# Patient Record
Sex: FEMALE | Race: WHITE | NOT HISPANIC OR LATINO | Employment: OTHER | URBAN - NONMETROPOLITAN AREA
[De-identification: names, ages, dates, MRNs, and addresses within clinical notes are randomized per-mention and may not be internally consistent; named-entity substitution may affect disease eponyms.]

---

## 2023-12-29 ENCOUNTER — CLINICAL SUPPORT (OUTPATIENT)
Dept: FAMILY MEDICINE CLINIC | Facility: CLINIC | Age: 48
End: 2023-12-29
Payer: MEDICARE

## 2023-12-29 DIAGNOSIS — N91.2 AMENORRHEA: ICD-10-CM

## 2023-12-29 DIAGNOSIS — E78.2 MIXED HYPERLIPIDEMIA: ICD-10-CM

## 2023-12-29 DIAGNOSIS — Z13.228 SCREENING FOR ENDOCRINE, METABOLIC AND IMMUNITY DISORDER: ICD-10-CM

## 2023-12-29 DIAGNOSIS — Z13.29 SCREENING FOR THYROID DISORDER: ICD-10-CM

## 2023-12-29 DIAGNOSIS — E11.65 CONTROLLED TYPE 2 DIABETES MELLITUS WITH HYPERGLYCEMIA, WITHOUT LONG-TERM CURRENT USE OF INSULIN: ICD-10-CM

## 2023-12-29 DIAGNOSIS — Z13.29 SCREENING FOR ENDOCRINE, METABOLIC AND IMMUNITY DISORDER: ICD-10-CM

## 2023-12-29 DIAGNOSIS — Z13.0 SCREENING FOR ENDOCRINE, METABOLIC AND IMMUNITY DISORDER: ICD-10-CM

## 2023-12-29 PROCEDURE — 83001 ASSAY OF GONADOTROPIN (FSH): CPT | Performed by: REGISTERED NURSE

## 2023-12-29 PROCEDURE — 83002 ASSAY OF GONADOTROPIN (LH): CPT | Performed by: REGISTERED NURSE

## 2023-12-29 PROCEDURE — 84443 ASSAY THYROID STIM HORMONE: CPT | Performed by: REGISTERED NURSE

## 2023-12-29 PROCEDURE — 85025 COMPLETE CBC W/AUTO DIFF WBC: CPT | Performed by: REGISTERED NURSE

## 2023-12-29 PROCEDURE — 80053 COMPREHEN METABOLIC PANEL: CPT | Performed by: REGISTERED NURSE

## 2023-12-29 PROCEDURE — 36415 COLL VENOUS BLD VENIPUNCTURE: CPT | Performed by: REGISTERED NURSE

## 2023-12-29 PROCEDURE — 80061 LIPID PANEL: CPT | Performed by: REGISTERED NURSE

## 2023-12-29 PROCEDURE — 83036 HEMOGLOBIN GLYCOSYLATED A1C: CPT | Performed by: REGISTERED NURSE

## 2023-12-29 NOTE — PROGRESS NOTES
The ABCs of the Annual Wellness Visit  Subsequent Medicare Wellness Visit    Chief Complaint   Patient presents with    Medicare Wellness-subsequent       Subjective   History of Present Illness:  Raya Dejesus is a 48 y.o. female who presents for a Subsequent Medicare Wellness Visit. The patient is here: for coordination of medical care to discuss health maintenance and disease prevention to follow up on multiple medical problems. Overall has: minimal activity with work/home activities. Nutrition: balanced diet. Last tetanus shot was unknown.    Patient is a 48 y.o. female  presents to the clinic today for annual medicare wellness exam.  Patient denies any chest pain, shortness of breath, or any fevers.  Patient denies any known exposure to COVID, flu, or any other contagious illnesses.    Patient shares that she has been experiencing amenorrhea for approximately 2 months now. We discussed FSH and LH lab work. Patient appears most likely in the ovulation phase. We discussed menopause and how that menstruation can become irregular as menopause nears.        HEALTH RISK ASSESSMENT    Recent Hospitalizations:  No hospitalization(s) within the last year.    Current Medical Providers:  Patient Care Team:  Mehrdad Rosas APRN as PCP - General (Family Medicine)    Smoking Status:  Social History     Tobacco Use   Smoking Status Former    Packs/day: 2.00    Years: 15.00    Additional pack years: 0.00    Total pack years: 30.00    Types: Cigarettes    Start date: 1991    Quit date: 2010    Years since quittin.5    Passive exposure: Past   Smokeless Tobacco Never       Alcohol Consumption:  Social History     Substance and Sexual Activity   Alcohol Use Not Currently       Depression Screen:       2024     8:56 AM   PHQ-2/PHQ-9 Depression Screening   Little Interest or Pleasure in Doing Things 0-->not at all   Feeling Down, Depressed or Hopeless 0-->not at all   PHQ-9: Brief Depression Severity  Measure Score 0     I spent less than 16 minutes asking patient questions, counseling and documenting in the chart    Fall Risk Screen:  AMADA Fall Risk Assessment was completed, and patient is at HIGH risk for falls. Assessment completed on:2024    Health Habits and Functional and Cognitive Screenin/2/2024     8:00 AM   Functional & Cognitive Status   Do you have difficulty preparing food and eating? No   Do you have difficulty bathing yourself, getting dressed or grooming yourself? No   Do you have difficulty using the toilet? No   Do you have difficulty moving around from place to place? No   Do you have trouble with steps or getting out of a bed or a chair? No   Current Diet Well Balanced Diet   Dental Exam Up to date   Eye Exam Up to date   Exercise (times per week) 0 times per week   Current Exercises Include No Regular Exercise   Do you need help using the phone?  No   Are you deaf or do you have serious difficulty hearing?  No   Do you need help to go to places out of walking distance? No   Do you need help shopping? No   Do you need help preparing meals?  No   Do you need help with housework?  No   Do you need help with laundry? No   Do you need help taking your medications? No   Do you need help managing money? No   Do you ever drive or ride in a car without wearing a seat belt? No   Have you felt unusual stress, anger or loneliness in the last month? No   Who do you live with? Alone   If you need help, do you have trouble finding someone available to you? No   Have you been bothered in the last four weeks by sexual problems? No   Do you have difficulty concentrating, remembering or making decisions? No       Does the patient have evidence of cognitive impairment? No    Asprin use counseling:Taking ASA appropriately as indicated    Recent Lab Results:  Lab Results   Component Value Date    TRIG 146 2023    HDL 45 2023     (H) 2023    VLDL 26 2023    HGBA1C 5.20  12/29/2023        Age-appropriate Screening Schedule:  Refer to the list below for future screening recommendations based on patient's age, sex and/or medical conditions. Orders for these recommended tests are listed in the plan section. The patient has been provided with a written plan.    Health Maintenance   Topic Date Due    ANNUAL WELLNESS VISIT  Never done    DIABETIC FOOT EXAM  Never done    URINE MICROALBUMIN  08/16/2023    COVID-19 Vaccine (4 - 2023-24 season) 01/29/2024 (Originally 9/1/2023)    Hepatitis B (1 of 3 - 3-dose series) 11/09/2024 (Originally 1975)    Pneumococcal Vaccine 0-64 (1 of 2 - PCV) 11/09/2024 (Originally 1/26/1982)    TDAP/TD VACCINES (1 - Tdap) 11/09/2024 (Originally 1/26/1995)    DIABETIC EYE EXAM  02/01/2024    HEMOGLOBIN A1C  06/29/2024    LIPID PANEL  12/29/2024    PAP SMEAR  04/18/2025    COLORECTAL CANCER SCREENING  11/08/2032    HEPATITIS C SCREENING  Completed    INFLUENZA VACCINE  Completed          The following portions of the patient's history were reviewed and updated as appropriate: allergies, current medications, past family history, past medical history, past social history, past surgical history, and problem list.    Outpatient Medications Prior to Visit   Medication Sig Dispense Refill    albuterol sulfate  (90 Base) MCG/ACT inhaler Inhale 2 puffs Every 4 (Four) Hours As Needed for Wheezing or Shortness of Air. 18 g 0    aspirin 81 MG chewable tablet Chew 1 tablet Daily.      carvedilol (COREG) 25 MG tablet Take 1 tablet by mouth 2 (Two) Times a Day With Meals.      Entresto 24-26 MG tablet Take 1 tablet by mouth 2 (Two) Times a Day.      fenofibrate (TRICOR) 145 MG tablet Take 1 tablet by mouth Daily. 90 tablet 1    Fluticasone-Salmeterol (Advair Diskus) 100-50 MCG/ACT DISKUS Inhale 1 puff 2 (Two) Times a Day. 60 each 5    furosemide (LASIX) 20 MG tablet Take 1 tablet by mouth As Needed.      multivitamin with minerals tablet tablet Take 1 tablet by  mouth Daily.      spironolactone (ALDACTONE) 25 MG tablet Take 1 tablet by mouth Daily.      albuterol sulfate  (90 Base) MCG/ACT inhaler Inhale 1 puff Every 4 (Four) Hours As Needed for Wheezing or Shortness of Air. (Patient not taking: Reported on 1/2/2024) 6.7 g 5    cetirizine (zyrTEC) 10 MG tablet Take 1 tablet by mouth Daily. (Patient not taking: Reported on 1/2/2024) 30 tablet 0    famotidine (PEPCID) 40 MG tablet Take 1 tablet by mouth every night at bedtime. (Patient not taking: Reported on 1/2/2024)       No facility-administered medications prior to visit.       Patient Active Problem List   Diagnosis    Asthma    Hyperlipidemia    Mild chronic obstructive pulmonary disease    Biventricular ICD (implantable cardioverter-defibrillator) in place    Chronic systolic CHF (congestive heart failure), NYHA class 3    Diabetes type 2, controlled    Dilated cardiomyopathy    Benign essential HTN    Fatigue    Hx of mitral valve prolapse    LBBB (left bundle branch block)    Migraine without aura    Mitral valve disorder    Neuropathy    Obstructive sleep apnea    Overweight    Restless legs    Shoulder joint pain    Ulcer, stomach peptic    Pulmonary emphysema       Advanced Care Planning:  ACP discussion was held with the patient during this visit. Patient does not have an advance directive, declines further assistance.    A face-to-face visit was completed today with patient.  Counseling explanation, and discussion of advanced directives was performed.   The last advanced care visit was performed in 2023.  In a near life ending situation, from which she is not expected to recover functionally, and she is not able to speak for her, she does not want life sustaining measures. We discussed feeding tubes, ventilators and cardiac support as well as dialysis.    I spent less than 16 minutes discussing Advanced Care Planning information and documenting in the chart.    Review of Systems    I have reviewed and  "confirmed the accuracy of the HPI and ROS as documented by the MA/LPN/RN DELBERT Katz    Compared to one year ago, the patient feels her physical health is the same.  Compared to one year ago, the patient feels her mental health is the same.    Reviewed chart for potential of high risk medication in the elderly: yes  Reviewed chart for potential of harmful drug interactions in the elderly:yes    Objective      Vitals:    01/02/24 0859   BP: 102/60   BP Location: Left arm   Patient Position: Sitting   Cuff Size: Adult   Pulse: 88   Resp: 18   Temp: 98.8 °F (37.1 °C)   TempSrc: Oral   SpO2: 100%   Weight: 61.7 kg (136 lb)   Height: 160 cm (63\")   PainSc:   7       Body mass index is 24.09 kg/m².  Discussed the patient's BMI with her. The BMI is in the acceptable range.    Physical Exam    Assessment    Medicare Risks and Personalized Health Plan  CMS Preventative Services Quick Reference  Immunizations Discussed/Encouraged (specific immunizations; Tdap )    The above risks/problems have been discussed with the patient.  Pertinent information has been shared with the patient in the After Visit Summary.  Follow up plans and orders are seen below in the Assessment/Plan Section.    Problem List Items Addressed This Visit          Cardiac and Vasculature    Hyperlipidemia - Primary    Relevant Orders    Lipid Panel (Completed)       Endocrine and Metabolic    Diabetes type 2, controlled    Relevant Orders    Hemoglobin A1c (Completed)     Other Visit Diagnoses       Screening for thyroid disorder        Relevant Orders    TSH (Completed)    Screening for endocrine, metabolic and immunity disorder        Relevant Orders    CBC & Differential (Completed)    Comprehensive Metabolic Panel (Completed)    Amenorrhea        Relevant Orders    Follicle stimulating hormone (Completed)    Luteinizing hormone (Completed)          Follow Up:  Return in about 3 months (around 4/2/2024) for routine follow up.     An After " Visit Summary was given to the patient.       DELBERT Katz, FNP-BC

## 2023-12-29 NOTE — PROGRESS NOTES
Venipuncture Blood Specimen Collection  Venipuncture performed in rt arm via venipuncture by Alexi Anderson with good hemostasis. Patient tolerated the procedure well without complications.   12/29/23   Alexi Anderson

## 2023-12-30 LAB
ALBUMIN SERPL-MCNC: 4.5 G/DL (ref 3.5–5.2)
ALBUMIN/GLOB SERPL: 2.1 G/DL
ALP SERPL-CCNC: 37 U/L (ref 39–117)
ALT SERPL W P-5'-P-CCNC: 10 U/L (ref 1–33)
ANION GAP SERPL CALCULATED.3IONS-SCNC: 11.5 MMOL/L (ref 5–15)
AST SERPL-CCNC: 21 U/L (ref 1–32)
BASOPHILS # BLD AUTO: 0.03 10*3/MM3 (ref 0–0.2)
BASOPHILS NFR BLD AUTO: 0.4 % (ref 0–1.5)
BILIRUB SERPL-MCNC: 0.8 MG/DL (ref 0–1.2)
BUN SERPL-MCNC: 9 MG/DL (ref 6–20)
BUN/CREAT SERPL: 10.3 (ref 7–25)
CALCIUM SPEC-SCNC: 9.6 MG/DL (ref 8.6–10.5)
CHLORIDE SERPL-SCNC: 101 MMOL/L (ref 98–107)
CHOLEST SERPL-MCNC: 209 MG/DL (ref 0–200)
CO2 SERPL-SCNC: 25.5 MMOL/L (ref 22–29)
CREAT SERPL-MCNC: 0.87 MG/DL (ref 0.57–1)
DEPRECATED RDW RBC AUTO: 41 FL (ref 37–54)
EGFRCR SERPLBLD CKD-EPI 2021: 82.8 ML/MIN/1.73
EOSINOPHIL # BLD AUTO: 0.07 10*3/MM3 (ref 0–0.4)
EOSINOPHIL NFR BLD AUTO: 1 % (ref 0.3–6.2)
ERYTHROCYTE [DISTWIDTH] IN BLOOD BY AUTOMATED COUNT: 12.2 % (ref 12.3–15.4)
FSH SERPL-ACNC: 8.58 MIU/ML
GLOBULIN UR ELPH-MCNC: 2.1 GM/DL
GLUCOSE SERPL-MCNC: 93 MG/DL (ref 65–99)
HBA1C MFR BLD: 5.2 % (ref 4.8–5.6)
HCT VFR BLD AUTO: 38 % (ref 34–46.6)
HDLC SERPL-MCNC: 45 MG/DL (ref 40–60)
HGB BLD-MCNC: 12.9 G/DL (ref 12–15.9)
IMM GRANULOCYTES # BLD AUTO: 0.02 10*3/MM3 (ref 0–0.05)
IMM GRANULOCYTES NFR BLD AUTO: 0.3 % (ref 0–0.5)
LDLC SERPL CALC-MCNC: 138 MG/DL (ref 0–100)
LDLC/HDLC SERPL: 3 {RATIO}
LH SERPL-ACNC: 24.8 MIU/ML
LYMPHOCYTES # BLD AUTO: 1.62 10*3/MM3 (ref 0.7–3.1)
LYMPHOCYTES NFR BLD AUTO: 23 % (ref 19.6–45.3)
MCH RBC QN AUTO: 31.3 PG (ref 26.6–33)
MCHC RBC AUTO-ENTMCNC: 33.9 G/DL (ref 31.5–35.7)
MCV RBC AUTO: 92.2 FL (ref 79–97)
MONOCYTES # BLD AUTO: 0.6 10*3/MM3 (ref 0.1–0.9)
MONOCYTES NFR BLD AUTO: 8.5 % (ref 5–12)
NEUTROPHILS NFR BLD AUTO: 4.71 10*3/MM3 (ref 1.7–7)
NEUTROPHILS NFR BLD AUTO: 66.8 % (ref 42.7–76)
NRBC BLD AUTO-RTO: 0 /100 WBC (ref 0–0.2)
PLATELET # BLD AUTO: 283 10*3/MM3 (ref 140–450)
PMV BLD AUTO: 10.5 FL (ref 6–12)
POTASSIUM SERPL-SCNC: 4 MMOL/L (ref 3.5–5.2)
PROT SERPL-MCNC: 6.6 G/DL (ref 6–8.5)
RBC # BLD AUTO: 4.12 10*6/MM3 (ref 3.77–5.28)
SODIUM SERPL-SCNC: 138 MMOL/L (ref 136–145)
TRIGL SERPL-MCNC: 146 MG/DL (ref 0–150)
TSH SERPL DL<=0.05 MIU/L-ACNC: 1.88 UIU/ML (ref 0.27–4.2)
VLDLC SERPL-MCNC: 26 MG/DL (ref 5–40)
WBC NRBC COR # BLD AUTO: 7.05 10*3/MM3 (ref 3.4–10.8)

## 2024-01-02 ENCOUNTER — OFFICE VISIT (OUTPATIENT)
Dept: FAMILY MEDICINE CLINIC | Facility: CLINIC | Age: 49
End: 2024-01-02
Payer: MEDICARE

## 2024-01-02 VITALS
RESPIRATION RATE: 18 BRPM | HEIGHT: 63 IN | SYSTOLIC BLOOD PRESSURE: 102 MMHG | BODY MASS INDEX: 24.1 KG/M2 | WEIGHT: 136 LBS | TEMPERATURE: 98.8 F | DIASTOLIC BLOOD PRESSURE: 60 MMHG | HEART RATE: 88 BPM | OXYGEN SATURATION: 100 %

## 2024-01-02 DIAGNOSIS — E11.65 CONTROLLED TYPE 2 DIABETES MELLITUS WITH HYPERGLYCEMIA, WITHOUT LONG-TERM CURRENT USE OF INSULIN: ICD-10-CM

## 2024-01-02 DIAGNOSIS — Z00.00 ENCOUNTER FOR SUBSEQUENT ANNUAL WELLNESS VISIT (AWV) IN MEDICARE PATIENT: Primary | ICD-10-CM

## 2024-01-02 DIAGNOSIS — N91.2 AMENORRHEA: ICD-10-CM

## 2024-01-02 DIAGNOSIS — Z13.29 SCREENING FOR THYROID DISORDER: ICD-10-CM

## 2024-01-02 DIAGNOSIS — Z13.228 SCREENING FOR ENDOCRINE, METABOLIC AND IMMUNITY DISORDER: ICD-10-CM

## 2024-01-02 DIAGNOSIS — Z13.0 SCREENING FOR ENDOCRINE, METABOLIC AND IMMUNITY DISORDER: ICD-10-CM

## 2024-01-02 DIAGNOSIS — E78.2 MIXED HYPERLIPIDEMIA: ICD-10-CM

## 2024-01-02 DIAGNOSIS — Z13.29 SCREENING FOR ENDOCRINE, METABOLIC AND IMMUNITY DISORDER: ICD-10-CM

## 2024-01-02 PROCEDURE — 1160F RVW MEDS BY RX/DR IN RCRD: CPT | Performed by: REGISTERED NURSE

## 2024-01-02 PROCEDURE — 1170F FXNL STATUS ASSESSED: CPT | Performed by: REGISTERED NURSE

## 2024-01-02 PROCEDURE — 1159F MED LIST DOCD IN RCRD: CPT | Performed by: REGISTERED NURSE

## 2024-01-02 PROCEDURE — 3078F DIAST BP <80 MM HG: CPT | Performed by: REGISTERED NURSE

## 2024-01-02 PROCEDURE — G0439 PPPS, SUBSEQ VISIT: HCPCS | Performed by: REGISTERED NURSE

## 2024-01-02 PROCEDURE — 3074F SYST BP LT 130 MM HG: CPT | Performed by: REGISTERED NURSE

## 2024-04-02 ENCOUNTER — OFFICE VISIT (OUTPATIENT)
Dept: FAMILY MEDICINE CLINIC | Facility: CLINIC | Age: 49
End: 2024-04-02
Payer: MEDICARE

## 2024-04-02 VITALS
DIASTOLIC BLOOD PRESSURE: 80 MMHG | WEIGHT: 135 LBS | TEMPERATURE: 98.1 F | BODY MASS INDEX: 23.92 KG/M2 | HEART RATE: 67 BPM | RESPIRATION RATE: 18 BRPM | OXYGEN SATURATION: 99 % | HEIGHT: 63 IN | SYSTOLIC BLOOD PRESSURE: 110 MMHG

## 2024-04-02 DIAGNOSIS — R05.1 ACUTE COUGH: ICD-10-CM

## 2024-04-02 DIAGNOSIS — E78.2 MIXED HYPERLIPIDEMIA: ICD-10-CM

## 2024-04-02 DIAGNOSIS — S09.90XA INJURY OF HEAD, INITIAL ENCOUNTER: ICD-10-CM

## 2024-04-02 DIAGNOSIS — R11.2 NAUSEA AND VOMITING, UNSPECIFIED VOMITING TYPE: ICD-10-CM

## 2024-04-02 DIAGNOSIS — R42 DIZZINESS: ICD-10-CM

## 2024-04-02 DIAGNOSIS — J45.909 MODERATE ASTHMA, UNSPECIFIED WHETHER COMPLICATED, UNSPECIFIED WHETHER PERSISTENT: Primary | ICD-10-CM

## 2024-04-02 PROCEDURE — 1160F RVW MEDS BY RX/DR IN RCRD: CPT | Performed by: REGISTERED NURSE

## 2024-04-02 PROCEDURE — 3074F SYST BP LT 130 MM HG: CPT | Performed by: REGISTERED NURSE

## 2024-04-02 PROCEDURE — 99215 OFFICE O/P EST HI 40 MIN: CPT | Performed by: REGISTERED NURSE

## 2024-04-02 PROCEDURE — 3079F DIAST BP 80-89 MM HG: CPT | Performed by: REGISTERED NURSE

## 2024-04-02 PROCEDURE — 1159F MED LIST DOCD IN RCRD: CPT | Performed by: REGISTERED NURSE

## 2024-04-02 RX ORDER — MECLIZINE HYDROCHLORIDE 25 MG/1
25 TABLET ORAL 3 TIMES DAILY PRN
Qty: 30 TABLET | Refills: 1 | Status: SHIPPED | OUTPATIENT
Start: 2024-04-02

## 2024-04-02 RX ORDER — ONDANSETRON 4 MG/1
4 TABLET, ORALLY DISINTEGRATING ORAL EVERY 8 HOURS PRN
Qty: 30 TABLET | Refills: 1 | Status: SHIPPED | OUTPATIENT
Start: 2024-04-02

## 2024-04-02 RX ORDER — ALBUTEROL SULFATE 90 UG/1
2 AEROSOL, METERED RESPIRATORY (INHALATION) EVERY 4 HOURS PRN
Qty: 18 G | Refills: 1 | Status: SHIPPED | OUTPATIENT
Start: 2024-04-02

## 2024-04-02 RX ORDER — FENOFIBRATE 145 MG/1
145 TABLET, COATED ORAL DAILY
Qty: 90 TABLET | Refills: 2 | Status: SHIPPED | OUTPATIENT
Start: 2024-04-02

## 2024-04-02 RX ORDER — FLUTICASONE PROPIONATE AND SALMETEROL 100; 50 UG/1; UG/1
1 POWDER RESPIRATORY (INHALATION)
Qty: 60 EACH | Refills: 5 | Status: SHIPPED | OUTPATIENT
Start: 2024-04-02

## 2024-04-02 NOTE — PROGRESS NOTES
Chief Complaint  Follow-up (Patient is here for a 3 month follow up), Diabetes, and Headache (Patient has been having dizziness and headache past two days, she stated she hit her head on a cabinet pretty hard and it is . )    Subjective    History of Present Illness {CC  Problem List  Visit  Diagnosis   Encounters  Notes  Medications  Labs  Result Review Imaging  Media :23}     Raya Dejesus presents to [facility] for Follow-up (Patient is here for a 3 month follow up), Diabetes, and Headache (Patient has been having dizziness and headache past two days, she stated she hit her head on a cabinet pretty hard and it is . ).      History of Present Illness  Patient is a 49 y.o. female  presents to the clinic today for 3-month follow-up for asthma and hyperlipidemia with concerns of recent head injury x 1 week.  Patient denies any chest pain, shortness of breath, or any fevers.  Patient denies any known exposure to COVID, flu, or any other contagious illnesses.    In regards to asthma, patient is currently taking Advair for daily management of her asthma.  She does have albuterol board if she has moments of extreme shortness of breath and needs rescue inhaler.  Patient denies any significant changes in her breathing and shares that she rarely needs her rescue inhaler.  Patient denies any concerns with her asthma or asthma medications at this time.    In regard to her hyperlipidemia, patient is currently taking fenofibrate.  Reviewed her labs from December at today's visit.  Patient is currently not fasting. She is agreeable to fasting labs at the next 3 month visit.    In regards to head injury, patient reports that she hit the top of her head on her countertop at home while bending over and trying to stand back up last week.  She shares that she did not lose consciousness, but reports some blurry vision in both eyes, nausea.  An episode of vomitting,  "Dizzy, and intermittent headaches.  She shares that tylenol is not helping with the headaches.  Report symptoms are not worsening,but  they are staying the same.  She reports that her head is sore to the touch.  When asking what she did for medical attention she shares that she did not go to ER.  I do advise her to go in for thorough evaluation but she would rather avoid the ER at this time.  She is requesting a head CT to see if she has a concussion.       Patient continues to follow with [facility]/heart failure/pacemaker clinic.  She denies any new concerns regarding her heart failure.    Patient requested refills on her albuterol prn and advair daily inhaler at today's visit. She denies worsening shortness of breath and wheezing.     Concussion  This is a new problem. The current episode started in the past 7 days. The problem occurs constantly. The problem has been unchanged. Associated symptoms include headaches, nausea and a visual change. Pertinent negatives include no abdominal pain, arthralgias, chest pain, chills, congestion, coughing, fatigue, fever, myalgias, numbness, rash, sore throat, vomiting or weakness. Nothing aggravates the symptoms. She has tried acetaminophen, relaxation and rest for the symptoms. The treatment provided no relief.        Review of Systems   Constitutional: Negative.  Negative for activity change, chills, fatigue and fever.   HENT:  Negative for congestion, dental problem, ear pain, hearing loss, rhinorrhea, sinus pain, sore throat, tinnitus and trouble swallowing.    Eyes:  Positive for visual disturbance. Negative for pain.        Blurry vision \"Like a film over my eyes\"   Respiratory: Negative.  Negative for cough, chest tightness, shortness of breath and wheezing.    Cardiovascular: Negative.  Negative for chest pain, palpitations and leg swelling.   Gastrointestinal:  Positive for nausea. Negative for abdominal pain, diarrhea and vomiting.   Endocrine: Negative.  " "Negative for polydipsia, polyphagia and polyuria.   Genitourinary: Negative.  Negative for difficulty urinating, dysuria, frequency and urgency.   Musculoskeletal: Negative.  Negative for arthralgias, back pain and myalgias.   Skin: Negative.  Negative for color change, pallor, rash and wound.   Allergic/Immunologic: Negative.  Negative for environmental allergies.   Neurological:  Positive for dizziness, light-headedness and headaches. Negative for syncope, speech difficulty, weakness and numbness.   Hematological: Negative.    Psychiatric/Behavioral: Negative.  Negative for confusion, decreased concentration, self-injury and suicidal ideas. The patient is not nervous/anxious.    All other systems reviewed and are negative.       Objective     Vital Signs:   /80 (BP Location: Left arm, Patient Position: Sitting, Cuff Size: Adult)   Pulse 67   Temp 98.1 °F (36.7 °C) (Oral)   Resp 18   Ht 160 cm (63\")   Wt 61.2 kg (135 lb)   SpO2 99%   BMI 23.91 kg/m²   Current Outpatient Medications on File Prior to Visit   Medication Sig Dispense Refill    aspirin 81 MG chewable tablet Chew 1 tablet Daily.      carvedilol (COREG) 25 MG tablet Take 1 tablet by mouth 2 (Two) Times a Day With Meals.      Entresto 24-26 MG tablet Take 1 tablet by mouth 2 (Two) Times a Day.      furosemide (LASIX) 20 MG tablet Take 1 tablet by mouth As Needed.      multivitamin with minerals tablet tablet Take 1 tablet by mouth Daily.      spironolactone (ALDACTONE) 25 MG tablet Take 1 tablet by mouth Daily.      albuterol sulfate  (90 Base) MCG/ACT inhaler Inhale 1 puff Every 4 (Four) Hours As Needed for Wheezing or Shortness of Air. (Patient not taking: Reported on 1/2/2024) 6.7 g 5    cetirizine (zyrTEC) 10 MG tablet Take 1 tablet by mouth Daily. (Patient not taking: Reported on 1/2/2024) 30 tablet 0    famotidine (PEPCID) 40 MG tablet Take 1 tablet by mouth every night at bedtime. (Patient not taking: Reported on 1/2/2024)   "     No current facility-administered medications on file prior to visit.        Past Medical History:   Diagnosis Date    Arthritis     Cardiomyopathy     CHF (congestive heart failure)     COPD (chronic obstructive pulmonary disease)     Diabetes mellitus     GERD (gastroesophageal reflux disease)     Heart disease     Hyperlipidemia     Hypertension     ICD (implantable cardioverter-defibrillator) battery depletion     LBBB (left bundle branch block)     Low back pain     MVP (mitral valve prolapse)     KONG (nonalcoholic steatohepatitis)     Neuropathy     UTI (urinary tract infection) 2022      Past Surgical History:   Procedure Laterality Date    CARDIAC CATHETERIZATION      COLONOSCOPY  2022    Negative    ENDOMETRIAL ABLATION      ENDOSCOPY  2022    Negative    TUBAL ABDOMINAL LIGATION        Family History   Problem Relation Age of Onset    Ovarian cancer Mother     Breast cancer Mother     Cancer Mother     Heart disease Father     Ovarian cancer Maternal Grandmother     Heart disease Maternal Grandmother     Diabetes Maternal Grandmother     Ovarian cancer Paternal Grandmother     Heart disease Paternal Grandmother     Breast cancer Maternal Aunt     Breast cancer Paternal Aunt       Social History     Socioeconomic History    Marital status:    Tobacco Use    Smoking status: Former     Current packs/day: 0.00     Average packs/day: 2.0 packs/day for 19.5 years (39.0 ttl pk-yrs)     Types: Cigarettes     Start date: 1991     Quit date: 2010     Years since quittin.7     Passive exposure: Past    Smokeless tobacco: Never   Vaping Use    Vaping status: Never Used   Substance and Sexual Activity    Alcohol use: Not Currently    Drug use: Never    Sexual activity: Not Currently     Partners: Male     Birth control/protection: None         No visits with results within 3 Month(s) from this visit.   Latest known visit with results is:   Clinical Support on 2023   Component  Date Value Ref Range Status    Glucose 12/29/2023 93  65 - 99 mg/dL Final    BUN 12/29/2023 9  6 - 20 mg/dL Final    Creatinine 12/29/2023 0.87  0.57 - 1.00 mg/dL Final    Sodium 12/29/2023 138  136 - 145 mmol/L Final    Potassium 12/29/2023 4.0  3.5 - 5.2 mmol/L Final    Chloride 12/29/2023 101  98 - 107 mmol/L Final    CO2 12/29/2023 25.5  22.0 - 29.0 mmol/L Final    Calcium 12/29/2023 9.6  8.6 - 10.5 mg/dL Final    Total Protein 12/29/2023 6.6  6.0 - 8.5 g/dL Final    Albumin 12/29/2023 4.5  3.5 - 5.2 g/dL Final    ALT (SGPT) 12/29/2023 10  1 - 33 U/L Final    AST (SGOT) 12/29/2023 21  1 - 32 U/L Final    Alkaline Phosphatase 12/29/2023 37 (L)  39 - 117 U/L Final    Total Bilirubin 12/29/2023 0.8  0.0 - 1.2 mg/dL Final    Globulin 12/29/2023 2.1  gm/dL Final    A/G Ratio 12/29/2023 2.1  g/dL Final    BUN/Creatinine Ratio 12/29/2023 10.3  7.0 - 25.0 Final    Anion Gap 12/29/2023 11.5  5.0 - 15.0 mmol/L Final    eGFR 12/29/2023 82.8  >60.0 mL/min/1.73 Final    Total Cholesterol 12/29/2023 209 (H)  0 - 200 mg/dL Final    Triglycerides 12/29/2023 146  0 - 150 mg/dL Final    HDL Cholesterol 12/29/2023 45  40 - 60 mg/dL Final    LDL Cholesterol  12/29/2023 138 (H)  0 - 100 mg/dL Final    VLDL Cholesterol 12/29/2023 26  5 - 40 mg/dL Final    LDL/HDL Ratio 12/29/2023 3.00   Final    Hemoglobin A1C 12/29/2023 5.20  4.80 - 5.60 % Final    TSH 12/29/2023 1.880  0.270 - 4.200 uIU/mL Final    FSH 12/29/2023 8.58  mIU/mL Final    LH 12/29/2023 24.80  mIU/mL Final    WBC 12/29/2023 7.05  3.40 - 10.80 10*3/mm3 Final    RBC 12/29/2023 4.12  3.77 - 5.28 10*6/mm3 Final    Hemoglobin 12/29/2023 12.9  12.0 - 15.9 g/dL Final    Hematocrit 12/29/2023 38.0  34.0 - 46.6 % Final    MCV 12/29/2023 92.2  79.0 - 97.0 fL Final    MCH 12/29/2023 31.3  26.6 - 33.0 pg Final    MCHC 12/29/2023 33.9  31.5 - 35.7 g/dL Final    RDW 12/29/2023 12.2 (L)  12.3 - 15.4 % Final    RDW-SD 12/29/2023 41.0  37.0 - 54.0 fl Final    MPV 12/29/2023 10.5  6.0 -  12.0 fL Final    Platelets 12/29/2023 283  140 - 450 10*3/mm3 Final    Neutrophil % 12/29/2023 66.8  42.7 - 76.0 % Final    Lymphocyte % 12/29/2023 23.0  19.6 - 45.3 % Final    Monocyte % 12/29/2023 8.5  5.0 - 12.0 % Final    Eosinophil % 12/29/2023 1.0  0.3 - 6.2 % Final    Basophil % 12/29/2023 0.4  0.0 - 1.5 % Final    Immature Grans % 12/29/2023 0.3  0.0 - 0.5 % Final    Neutrophils, Absolute 12/29/2023 4.71  1.70 - 7.00 10*3/mm3 Final    Lymphocytes, Absolute 12/29/2023 1.62  0.70 - 3.10 10*3/mm3 Final    Monocytes, Absolute 12/29/2023 0.60  0.10 - 0.90 10*3/mm3 Final    Eosinophils, Absolute 12/29/2023 0.07  0.00 - 0.40 10*3/mm3 Final    Basophils, Absolute 12/29/2023 0.03  0.00 - 0.20 10*3/mm3 Final    Immature Grans, Absolute 12/29/2023 0.02  0.00 - 0.05 10*3/mm3 Final    nRBC 12/29/2023 0.0  0.0 - 0.2 /100 WBC Final         Physical Exam  Vitals and nursing note reviewed.   Constitutional:       Appearance: Normal appearance. She is normal weight.   HENT:      Head: Normocephalic and atraumatic.   Cardiovascular:      Rate and Rhythm: Normal rate and regular rhythm.      Pulses: Normal pulses.      Heart sounds: Normal heart sounds. No murmur heard.     No friction rub. No gallop.   Pulmonary:      Effort: Pulmonary effort is normal. No respiratory distress.      Breath sounds: Normal breath sounds. No stridor. No wheezing, rhonchi or rales.   Chest:      Chest wall: No tenderness.   Abdominal:      General: Abdomen is flat. Bowel sounds are normal. There is no distension.      Palpations: Abdomen is soft. There is no mass.      Tenderness: There is no abdominal tenderness. There is no right CVA tenderness, left CVA tenderness, guarding or rebound.      Hernia: No hernia is present.   Skin:     General: Skin is warm and dry.      Capillary Refill: Capillary refill takes less than 2 seconds.      Coloration: Skin is not jaundiced or pale.   Neurological:      General: No focal deficit present.      Mental  Status: She is alert and oriented to person, place, and time. Mental status is at baseline.      Motor: No weakness.      Coordination: Coordination normal.      Gait: Gait normal.   Psychiatric:         Mood and Affect: Mood normal.         Behavior: Behavior normal.         Thought Content: Thought content normal.         Judgment: Judgment normal.          Result Review  Data Reviewed:{ Labs  Result Review  Imaging  Med Tab  Media :23}   I have reviewed this patient's chart.  I have reviewed previous labs, previous imaging, previous medications, and previous encounters with notes that were available in this patient's chart.               Assessment and Plan {CC Problem List  Visit Diagnosis  ROS  Review (Popup)  [facility]  BestPractice  Medications  SmartSets  SnapShot Encounters  Media :23}   Diagnoses and all orders for this visit:    1. Moderate asthma, unspecified whether complicated, unspecified whether persistent (Primary)  -     Fluticasone-Salmeterol (Advair Diskus) 100-50 MCG/ACT DISKUS; Inhale 1 puff 2 (Two) Times a Day.  Dispense: 60 each; Refill: 5    2. Mixed hyperlipidemia  -     fenofibrate (TRICOR) 145 MG tablet; Take 1 tablet by mouth Daily.  Dispense: 90 tablet; Refill: 2    3. Acute cough  Comments:  Tessalon as needed, albuterol inhaler as needed.  Orders:  -     albuterol sulfate  (90 Base) MCG/ACT inhaler; Inhale 2 puffs Every 4 (Four) Hours As Needed for Wheezing or Shortness of Air.  Dispense: 18 g; Refill: 1    4. Injury of head, initial encounter  -     CT Head Without Contrast; Future    5. Dizziness  -     meclizine (ANTIVERT) 25 MG tablet; Take 1 tablet by mouth 3 (Three) Times a Day As Needed for Dizziness.  Dispense: 30 tablet; Refill: 1    6. Nausea and vomiting, unspecified vomiting type  -     ondansetron ODT (ZOFRAN-ODT) 4 MG disintegrating tablet; Place 1 tablet on the tongue Every 8 (Eight) Hours As Needed for Nausea or Vomiting.   Dispense: 30 tablet; Refill: 1        -CT head ordered and encouraged that patient to obtain imaging as symptoms have not improved.    -Patient encouraged to seek emergency attention if develop loss of vision, numbness/tingling of any extremities, change in consciousness, confusion, or irritractible vomitting.  Patient verbalized understanding.  -ER red flags discussed with patient including risk versus benefit and education provided.  -Follow-up with me 3 months for regular routine visit and follow-up in 2 weeks if symptoms worsen or change in any way.    I spent 40 minutes caring for Raya on this date of service. This time includes time spent by me in the following activities:preparing for the visit, reviewing tests, obtaining and/or reviewing a separately obtained history, performing a medically appropriate examination and/or evaluation , counseling and educating the patient/family/caregiver, ordering medications, tests, or procedures, referring and communicating with other health care professionals , documenting information in the medical record, independently interpreting results and communicating that information with the patient/family/caregiver, and care coordination.    Follow Up {Instructions Charge Capture  Follow-up Communications :23}     Patient was given instructions and counseling regarding her condition or for health maintenance advice. Please see specific information pulled into the AVS (placed there by myself) if appropriate.    Return in about 2 weeks (around 4/16/2024), or if symptoms worsen or fail to improve, for Recheck.    BMI is within normal parameters. No other follow-up for BMI required.       DELBERT Katz, FNP-BC

## 2024-04-29 DIAGNOSIS — R05.1 ACUTE COUGH: ICD-10-CM

## 2024-04-29 RX ORDER — ALBUTEROL SULFATE 90 UG/1
AEROSOL, METERED RESPIRATORY (INHALATION)
Qty: 36 G | Refills: 0 | Status: SHIPPED | OUTPATIENT
Start: 2024-04-29

## 2024-05-30 DIAGNOSIS — R05.1 ACUTE COUGH: ICD-10-CM

## 2024-05-30 RX ORDER — ALBUTEROL SULFATE 90 UG/1
AEROSOL, METERED RESPIRATORY (INHALATION)
Qty: 36 G | Refills: 0 | Status: SHIPPED | OUTPATIENT
Start: 2024-05-30

## 2024-07-06 DIAGNOSIS — R05.1 ACUTE COUGH: ICD-10-CM

## 2024-07-08 RX ORDER — ALBUTEROL SULFATE 90 UG/1
2 AEROSOL, METERED RESPIRATORY (INHALATION) EVERY 4 HOURS PRN
Qty: 36 G | Refills: 0 | Status: SHIPPED | OUTPATIENT
Start: 2024-07-08

## 2024-08-02 ENCOUNTER — CLINICAL SUPPORT (OUTPATIENT)
Dept: FAMILY MEDICINE CLINIC | Facility: CLINIC | Age: 49
End: 2024-08-02
Payer: MEDICARE

## 2024-08-02 DIAGNOSIS — R53.83 FATIGUE, UNSPECIFIED TYPE: ICD-10-CM

## 2024-08-02 DIAGNOSIS — E11.21 CONTROLLED TYPE 2 DIABETES MELLITUS WITH DIABETIC NEPHROPATHY, WITHOUT LONG-TERM CURRENT USE OF INSULIN: ICD-10-CM

## 2024-08-02 DIAGNOSIS — E78.2 MIXED HYPERLIPIDEMIA: Primary | ICD-10-CM

## 2024-08-02 DIAGNOSIS — I10 BENIGN ESSENTIAL HTN: ICD-10-CM

## 2024-08-02 DIAGNOSIS — E66.3 OVERWEIGHT: ICD-10-CM

## 2024-08-02 DIAGNOSIS — I50.22 CHRONIC SYSTOLIC CHF (CONGESTIVE HEART FAILURE), NYHA CLASS 3: ICD-10-CM

## 2024-08-02 LAB
ALBUMIN SERPL-MCNC: 4.2 G/DL (ref 3.5–5.2)
ALBUMIN/GLOB SERPL: 1.7 G/DL
ALP SERPL-CCNC: 43 U/L (ref 39–117)
ALT SERPL W P-5'-P-CCNC: 14 U/L (ref 1–33)
ANION GAP SERPL CALCULATED.3IONS-SCNC: 9.1 MMOL/L (ref 5–15)
AST SERPL-CCNC: 25 U/L (ref 1–32)
BASOPHILS # BLD AUTO: 0.02 10*3/MM3 (ref 0–0.2)
BASOPHILS NFR BLD AUTO: 0.4 % (ref 0–1.5)
BILIRUB SERPL-MCNC: 0.3 MG/DL (ref 0–1.2)
BUN SERPL-MCNC: 13 MG/DL (ref 6–20)
BUN/CREAT SERPL: 16.5 (ref 7–25)
CALCIUM SPEC-SCNC: 9.1 MG/DL (ref 8.6–10.5)
CHLORIDE SERPL-SCNC: 104 MMOL/L (ref 98–107)
CHOLEST SERPL-MCNC: 126 MG/DL (ref 0–200)
CO2 SERPL-SCNC: 23.9 MMOL/L (ref 22–29)
CREAT SERPL-MCNC: 0.79 MG/DL (ref 0.57–1)
DEPRECATED RDW RBC AUTO: 43.4 FL (ref 37–54)
EGFRCR SERPLBLD CKD-EPI 2021: 92.4 ML/MIN/1.73
EOSINOPHIL # BLD AUTO: 0.06 10*3/MM3 (ref 0–0.4)
EOSINOPHIL NFR BLD AUTO: 1.1 % (ref 0.3–6.2)
ERYTHROCYTE [DISTWIDTH] IN BLOOD BY AUTOMATED COUNT: 12.4 % (ref 12.3–15.4)
GLOBULIN UR ELPH-MCNC: 2.5 GM/DL
GLUCOSE SERPL-MCNC: 97 MG/DL (ref 65–99)
HBA1C MFR BLD: 5.34 % (ref 4.8–5.6)
HCT VFR BLD AUTO: 41.1 % (ref 34–46.6)
HDLC SERPL-MCNC: 37 MG/DL (ref 40–60)
HGB BLD-MCNC: 13 G/DL (ref 12–15.9)
IMM GRANULOCYTES # BLD AUTO: 0.01 10*3/MM3 (ref 0–0.05)
IMM GRANULOCYTES NFR BLD AUTO: 0.2 % (ref 0–0.5)
LDLC SERPL CALC-MCNC: 70 MG/DL (ref 0–100)
LDLC/HDLC SERPL: 1.86 {RATIO}
LYMPHOCYTES # BLD AUTO: 1.45 10*3/MM3 (ref 0.7–3.1)
LYMPHOCYTES NFR BLD AUTO: 25.9 % (ref 19.6–45.3)
MCH RBC QN AUTO: 30 PG (ref 26.6–33)
MCHC RBC AUTO-ENTMCNC: 31.6 G/DL (ref 31.5–35.7)
MCV RBC AUTO: 94.9 FL (ref 79–97)
MONOCYTES # BLD AUTO: 0.63 10*3/MM3 (ref 0.1–0.9)
MONOCYTES NFR BLD AUTO: 11.3 % (ref 5–12)
NEUTROPHILS NFR BLD AUTO: 3.43 10*3/MM3 (ref 1.7–7)
NEUTROPHILS NFR BLD AUTO: 61.1 % (ref 42.7–76)
NRBC BLD AUTO-RTO: 0 /100 WBC (ref 0–0.2)
PLATELET # BLD AUTO: 256 10*3/MM3 (ref 140–450)
PMV BLD AUTO: 11.2 FL (ref 6–12)
POTASSIUM SERPL-SCNC: 4.2 MMOL/L (ref 3.5–5.2)
PROT SERPL-MCNC: 6.7 G/DL (ref 6–8.5)
RBC # BLD AUTO: 4.33 10*6/MM3 (ref 3.77–5.28)
SODIUM SERPL-SCNC: 137 MMOL/L (ref 136–145)
TRIGL SERPL-MCNC: 101 MG/DL (ref 0–150)
TSH SERPL DL<=0.05 MIU/L-ACNC: 2.19 UIU/ML (ref 0.27–4.2)
VLDLC SERPL-MCNC: 19 MG/DL (ref 5–40)
WBC NRBC COR # BLD AUTO: 5.6 10*3/MM3 (ref 3.4–10.8)

## 2024-08-02 PROCEDURE — 83036 HEMOGLOBIN GLYCOSYLATED A1C: CPT | Performed by: REGISTERED NURSE

## 2024-08-02 PROCEDURE — 80061 LIPID PANEL: CPT | Performed by: REGISTERED NURSE

## 2024-08-02 PROCEDURE — 85025 COMPLETE CBC W/AUTO DIFF WBC: CPT | Performed by: REGISTERED NURSE

## 2024-08-02 PROCEDURE — 84443 ASSAY THYROID STIM HORMONE: CPT | Performed by: REGISTERED NURSE

## 2024-08-02 PROCEDURE — 80053 COMPREHEN METABOLIC PANEL: CPT | Performed by: REGISTERED NURSE

## 2024-08-02 PROCEDURE — 36415 COLL VENOUS BLD VENIPUNCTURE: CPT | Performed by: REGISTERED NURSE

## 2024-08-02 NOTE — PROGRESS NOTES
Venipuncture Blood Specimen Collection  Venipuncture performed in rt arm via venipuncture by Alexi Martinez with good hemostasis. Patient tolerated the procedure well without complications.   08/02/24   Alexi Martinez

## 2024-08-07 ENCOUNTER — OFFICE VISIT (OUTPATIENT)
Dept: FAMILY MEDICINE CLINIC | Facility: CLINIC | Age: 49
End: 2024-08-07
Payer: MEDICARE

## 2024-08-07 VITALS
DIASTOLIC BLOOD PRESSURE: 70 MMHG | TEMPERATURE: 97 F | HEART RATE: 73 BPM | OXYGEN SATURATION: 99 % | RESPIRATION RATE: 18 BRPM | WEIGHT: 136 LBS | BODY MASS INDEX: 24.1 KG/M2 | SYSTOLIC BLOOD PRESSURE: 100 MMHG | HEIGHT: 63 IN

## 2024-08-07 DIAGNOSIS — E55.9 VITAMIN D DEFICIENCY, UNSPECIFIED: ICD-10-CM

## 2024-08-07 DIAGNOSIS — R53.83 OTHER FATIGUE: ICD-10-CM

## 2024-08-07 DIAGNOSIS — R53.83 FATIGUE, UNSPECIFIED TYPE: Primary | ICD-10-CM

## 2024-08-07 DIAGNOSIS — T14.8XXA BRUISING: ICD-10-CM

## 2024-08-07 DIAGNOSIS — R79.9 ABNORMAL FINDING OF BLOOD CHEMISTRY, UNSPECIFIED: ICD-10-CM

## 2024-08-07 LAB
IRON 24H UR-MRATE: 100 MCG/DL (ref 37–145)
IRON SATN MFR SERPL: 27 % (ref 20–50)
TIBC SERPL-MCNC: 368 MCG/DL (ref 298–536)
TRANSFERRIN SERPL-MCNC: 247 MG/DL (ref 200–360)

## 2024-08-07 PROCEDURE — 83002 ASSAY OF GONADOTROPIN (LH): CPT | Performed by: REGISTERED NURSE

## 2024-08-07 PROCEDURE — 3044F HG A1C LEVEL LT 7.0%: CPT | Performed by: REGISTERED NURSE

## 2024-08-07 PROCEDURE — 3078F DIAST BP <80 MM HG: CPT | Performed by: REGISTERED NURSE

## 2024-08-07 PROCEDURE — 83001 ASSAY OF GONADOTROPIN (FSH): CPT | Performed by: REGISTERED NURSE

## 2024-08-07 PROCEDURE — 82306 VITAMIN D 25 HYDROXY: CPT | Performed by: REGISTERED NURSE

## 2024-08-07 PROCEDURE — 1126F AMNT PAIN NOTED NONE PRSNT: CPT | Performed by: REGISTERED NURSE

## 2024-08-07 PROCEDURE — 82607 VITAMIN B-12: CPT | Performed by: REGISTERED NURSE

## 2024-08-07 PROCEDURE — 82728 ASSAY OF FERRITIN: CPT | Performed by: REGISTERED NURSE

## 2024-08-07 PROCEDURE — 99213 OFFICE O/P EST LOW 20 MIN: CPT | Performed by: REGISTERED NURSE

## 2024-08-07 PROCEDURE — 82746 ASSAY OF FOLIC ACID SERUM: CPT | Performed by: REGISTERED NURSE

## 2024-08-07 PROCEDURE — 84402 ASSAY OF FREE TESTOSTERONE: CPT | Performed by: REGISTERED NURSE

## 2024-08-07 PROCEDURE — 83540 ASSAY OF IRON: CPT | Performed by: REGISTERED NURSE

## 2024-08-07 PROCEDURE — 36415 COLL VENOUS BLD VENIPUNCTURE: CPT | Performed by: REGISTERED NURSE

## 2024-08-07 PROCEDURE — 84466 ASSAY OF TRANSFERRIN: CPT | Performed by: REGISTERED NURSE

## 2024-08-07 PROCEDURE — 3074F SYST BP LT 130 MM HG: CPT | Performed by: REGISTERED NURSE

## 2024-08-07 NOTE — PROGRESS NOTES
Venipuncture Blood Specimen Collection  Venipuncture performed in Rt arm via venipuncture by Alexi Martinez with good hemostasis. Patient tolerated the procedure well without complications.   08/07/24   Alexi Martinez

## 2024-08-07 NOTE — PROGRESS NOTES
Chief Complaint  Follow-up (Patient is here for a 3 month follow up), Diabetes, Hyperlipidemia, and Hypertension    Subjective    History of Present Illness {CC  Problem List  Visit  Diagnosis   Encounters  Notes  Medications  Labs  Result Review Imaging  Media :23}     Raya Dejesus presents to [facility] for Follow-up (Patient is here for a 3 month follow up), Diabetes, Hyperlipidemia, and Hypertension.      History of Present Illness  Patient is a 49 y.o. female who presents to the clinic today for concerns with fatigue and bruising greater than 1 month.  Patient denies any chest pain, shortness of breath, or any fevers.  Patient denies any known exposure to COVID, flu, or any other contagious illnesses.    In regards to concerns, patient shares that she has been experiencing fatigue and bruising for more than a month now.  She is concerned that her iron or other labs are abnormal.  She is requesting anemia workup.  These labs will be ordered today.       Review of Systems   Constitutional: Negative.  Negative for activity change, chills, fatigue and fever.   HENT: Negative.  Negative for congestion, dental problem, ear pain, hearing loss, rhinorrhea, sinus pain, sore throat, tinnitus and trouble swallowing.    Eyes: Negative.  Negative for pain and visual disturbance.   Respiratory: Negative.  Negative for cough, chest tightness, shortness of breath and wheezing.    Cardiovascular: Negative.  Negative for chest pain, palpitations and leg swelling.   Gastrointestinal: Negative.  Negative for abdominal pain, diarrhea, nausea and vomiting.   Endocrine: Negative.  Negative for polydipsia, polyphagia and polyuria.   Genitourinary: Negative.  Negative for difficulty urinating, dysuria, frequency and urgency.   Musculoskeletal: Negative.  Negative for arthralgias, back pain and myalgias.   Skin:  Positive for color change (Easily bruised skin). Negative for pallor, rash and  "wound.   Allergic/Immunologic: Negative.  Negative for environmental allergies.   Neurological: Negative.  Negative for dizziness, speech difficulty, weakness, light-headedness, numbness and headaches.   Hematological: Negative.    Psychiatric/Behavioral: Negative.  Negative for confusion, decreased concentration, self-injury and suicidal ideas. The patient is not nervous/anxious.    All other systems reviewed and are negative.       Objective     Vital Signs:   /70 (BP Location: Left arm, Patient Position: Sitting, Cuff Size: Large Adult)   Pulse 73   Temp 97 °F (36.1 °C) (Temporal)   Resp 18   Ht 160 cm (63\")   Wt 61.7 kg (136 lb)   SpO2 99%   BMI 24.09 kg/m²   Current Outpatient Medications on File Prior to Visit   Medication Sig Dispense Refill    aspirin 81 MG chewable tablet Chew 1 tablet Daily.      carvedilol (COREG) 25 MG tablet Take 1 tablet by mouth 2 (Two) Times a Day With Meals.      Entresto 24-26 MG tablet Take 1 tablet by mouth 2 (Two) Times a Day.      fenofibrate (TRICOR) 145 MG tablet Take 1 tablet by mouth Daily. 90 tablet 2    Fluticasone-Salmeterol (Advair Diskus) 100-50 MCG/ACT DISKUS Inhale 1 puff 2 (Two) Times a Day. 60 each 5    furosemide (LASIX) 20 MG tablet Take 1 tablet by mouth As Needed.      meclizine (ANTIVERT) 25 MG tablet Take 1 tablet by mouth 3 (Three) Times a Day As Needed for Dizziness. 30 tablet 1    multivitamin with minerals tablet tablet Take 1 tablet by mouth Daily.      ondansetron ODT (ZOFRAN-ODT) 4 MG disintegrating tablet Place 1 tablet on the tongue Every 8 (Eight) Hours As Needed for Nausea or Vomiting. 30 tablet 1    spironolactone (ALDACTONE) 25 MG tablet Take 1 tablet by mouth Daily.      Ventolin  (90 Base) MCG/ACT inhaler INHALE 2 PUFFS BY MOUTH EVERY 4 HOURS AS NEEDED FOR WHEEZING OR SHORTNESS OF BREATH 36 g 0     No current facility-administered medications on file prior to visit.        Past Medical History:   Diagnosis Date    Arthritis  "    Cardiomyopathy     CHF (congestive heart failure)     COPD (chronic obstructive pulmonary disease)     Diabetes mellitus     GERD (gastroesophageal reflux disease)     Heart disease     Hyperlipidemia     Hypertension     ICD (implantable cardioverter-defibrillator) battery depletion     LBBB (left bundle branch block)     Low back pain     MVP (mitral valve prolapse)     KONG (nonalcoholic steatohepatitis)     Neuropathy     UTI (urinary tract infection) 2022      Past Surgical History:   Procedure Laterality Date    CARDIAC CATHETERIZATION      COLONOSCOPY  2022    Negative    ENDOMETRIAL ABLATION      ENDOSCOPY  2022    Negative    TUBAL ABDOMINAL LIGATION        Family History   Problem Relation Age of Onset    Ovarian cancer Mother     Breast cancer Mother     Cancer Mother     Heart disease Father     Ovarian cancer Maternal Grandmother     Heart disease Maternal Grandmother     Diabetes Maternal Grandmother     Ovarian cancer Paternal Grandmother     Heart disease Paternal Grandmother     Breast cancer Maternal Aunt     Breast cancer Paternal Aunt       Social History     Socioeconomic History    Marital status:    Tobacco Use    Smoking status: Former     Current packs/day: 0.00     Average packs/day: 2.0 packs/day for 19.5 years (39.0 ttl pk-yrs)     Types: Cigarettes     Start date: 1991     Quit date: 2010     Years since quittin.1     Passive exposure: Past    Smokeless tobacco: Never   Vaping Use    Vaping status: Never Used   Substance and Sexual Activity    Alcohol use: Not Currently    Drug use: Never    Sexual activity: Not Currently     Partners: Male     Birth control/protection: None         Office Visit on 2024   Component Date Value Ref Range Status    Iron 2024 100  37 - 145 mcg/dL Final    Iron Saturation (TSAT) 2024 27  20 - 50 % Final    Transferrin 2024 247  200 - 360 mg/dL Final    TIBC 2024 368  298 - 536 mcg/dL Final     25 Hydroxy, Vitamin D 08/07/2024 45.9  30.0 - 100.0 ng/ml Final    Folate 08/07/2024 >20.00  4.78 - 24.20 ng/mL Final    Vitamin B-12 08/07/2024 495  211 - 946 pg/mL Final    Ferritin 08/07/2024 97.90  13.00 - 150.00 ng/mL Final    FSH 08/07/2024 27.30  mIU/mL Final    LH 08/07/2024 14.20  mIU/mL Final    Testosterone, Free 08/07/2024 0.4  0.0 - 4.2 pg/mL Final   Clinical Support on 08/02/2024   Component Date Value Ref Range Status    Hemoglobin A1C 08/02/2024 5.34  4.80 - 5.60 % Final    Glucose 08/02/2024 97  65 - 99 mg/dL Final    BUN 08/02/2024 13  6 - 20 mg/dL Final    Creatinine 08/02/2024 0.79  0.57 - 1.00 mg/dL Final    Sodium 08/02/2024 137  136 - 145 mmol/L Final    Potassium 08/02/2024 4.2  3.5 - 5.2 mmol/L Final    Chloride 08/02/2024 104  98 - 107 mmol/L Final    CO2 08/02/2024 23.9  22.0 - 29.0 mmol/L Final    Calcium 08/02/2024 9.1  8.6 - 10.5 mg/dL Final    Total Protein 08/02/2024 6.7  6.0 - 8.5 g/dL Final    Albumin 08/02/2024 4.2  3.5 - 5.2 g/dL Final    ALT (SGPT) 08/02/2024 14  1 - 33 U/L Final    AST (SGOT) 08/02/2024 25  1 - 32 U/L Final    Alkaline Phosphatase 08/02/2024 43  39 - 117 U/L Final    Total Bilirubin 08/02/2024 0.3  0.0 - 1.2 mg/dL Final    Globulin 08/02/2024 2.5  gm/dL Final    A/G Ratio 08/02/2024 1.7  g/dL Final    BUN/Creatinine Ratio 08/02/2024 16.5  7.0 - 25.0 Final    Anion Gap 08/02/2024 9.1  5.0 - 15.0 mmol/L Final    eGFR 08/02/2024 92.4  >60.0 mL/min/1.73 Final    TSH 08/02/2024 2.190  0.270 - 4.200 uIU/mL Final    Total Cholesterol 08/02/2024 126  0 - 200 mg/dL Final    Triglycerides 08/02/2024 101  0 - 150 mg/dL Final    HDL Cholesterol 08/02/2024 37 (L)  40 - 60 mg/dL Final    LDL Cholesterol  08/02/2024 70  0 - 100 mg/dL Final    VLDL Cholesterol 08/02/2024 19  5 - 40 mg/dL Final    LDL/HDL Ratio 08/02/2024 1.86   Final    WBC 08/02/2024 5.60  3.40 - 10.80 10*3/mm3 Final    RBC 08/02/2024 4.33  3.77 - 5.28 10*6/mm3 Final    Hemoglobin 08/02/2024 13.0  12.0 - 15.9  g/dL Final    Hematocrit 08/02/2024 41.1  34.0 - 46.6 % Final    MCV 08/02/2024 94.9  79.0 - 97.0 fL Final    MCH 08/02/2024 30.0  26.6 - 33.0 pg Final    MCHC 08/02/2024 31.6  31.5 - 35.7 g/dL Final    RDW 08/02/2024 12.4  12.3 - 15.4 % Final    RDW-SD 08/02/2024 43.4  37.0 - 54.0 fl Final    MPV 08/02/2024 11.2  6.0 - 12.0 fL Final    Platelets 08/02/2024 256  140 - 450 10*3/mm3 Final    Neutrophil % 08/02/2024 61.1  42.7 - 76.0 % Final    Lymphocyte % 08/02/2024 25.9  19.6 - 45.3 % Final    Monocyte % 08/02/2024 11.3  5.0 - 12.0 % Final    Eosinophil % 08/02/2024 1.1  0.3 - 6.2 % Final    Basophil % 08/02/2024 0.4  0.0 - 1.5 % Final    Immature Grans % 08/02/2024 0.2  0.0 - 0.5 % Final    Neutrophils, Absolute 08/02/2024 3.43  1.70 - 7.00 10*3/mm3 Final    Lymphocytes, Absolute 08/02/2024 1.45  0.70 - 3.10 10*3/mm3 Final    Monocytes, Absolute 08/02/2024 0.63  0.10 - 0.90 10*3/mm3 Final    Eosinophils, Absolute 08/02/2024 0.06  0.00 - 0.40 10*3/mm3 Final    Basophils, Absolute 08/02/2024 0.02  0.00 - 0.20 10*3/mm3 Final    Immature Grans, Absolute 08/02/2024 0.01  0.00 - 0.05 10*3/mm3 Final    nRBC 08/02/2024 0.0  0.0 - 0.2 /100 WBC Final         Physical Exam  Vitals and nursing note reviewed.   Constitutional:       Appearance: Normal appearance. She is normal weight.   HENT:      Head: Normocephalic and atraumatic.   Cardiovascular:      Rate and Rhythm: Normal rate and regular rhythm.      Pulses: Normal pulses.      Heart sounds: Normal heart sounds. No murmur heard.     No friction rub. No gallop.   Pulmonary:      Effort: Pulmonary effort is normal. No respiratory distress.      Breath sounds: Normal breath sounds. No stridor. No wheezing, rhonchi or rales.   Chest:      Chest wall: No tenderness.   Abdominal:      General: Abdomen is flat. Bowel sounds are normal. There is no distension.      Palpations: Abdomen is soft. There is no mass.      Tenderness: There is no abdominal tenderness. There is no  right CVA tenderness, left CVA tenderness, guarding or rebound.      Hernia: No hernia is present.   Skin:     General: Skin is warm and dry.      Capillary Refill: Capillary refill takes less than 2 seconds.      Coloration: Skin is not jaundiced or pale.   Neurological:      General: No focal deficit present.      Mental Status: She is alert and oriented to person, place, and time. Mental status is at baseline.      Motor: No weakness.      Coordination: Coordination normal.      Gait: Gait normal.   Psychiatric:         Mood and Affect: Mood normal.         Behavior: Behavior normal.         Thought Content: Thought content normal.         Judgment: Judgment normal.          Result Review  Data Reviewed:{ Labs  Result Review  Imaging  Med Tab  Media :23}   I have reviewed this patient's chart.  I have reviewed previous labs, previous imaging, previous medications, and previous encounters with notes that were available in this patient's chart.               Assessment and Plan {CC Problem List  Visit Diagnosis  ROS  Review (Popup)  [facility]  Quality  BestPractice  Medications  SmartSets  SnapShot Encounters  Media :23}   Diagnoses and all orders for this visit:    1. Fatigue, unspecified type (Primary)  -     Iron Profile  -     Vitamin D,25-Hydroxy  -     Vitamin B12 & Folate  -     Ferritin  -     Follicle stimulating hormone  -     Luteinizing hormone  -     Testosterone Free Direct    2. Other fatigue    3. Bruising  -     Iron Profile  -     Ferritin    4. Abnormal finding of blood chemistry, unspecified  -     Iron Profile  -     Ferritin    5. Vitamin D deficiency, unspecified  -     Vitamin D,25-Hydroxy        -Lab workup for fatigue and anemia.  -ER red flags discussed with patient including risk versus benefit and education provided.  -Follow-up with me in 2 to 4 weeks if not improving.    I spent 20 minutes caring for Raya on this date of service. This time includes time  spent by me in the following activities:preparing for the visit, reviewing tests, obtaining and/or reviewing a separately obtained history, performing a medically appropriate examination and/or evaluation , counseling and educating the patient/family/caregiver, ordering medications, tests, or procedures, referring and communicating with other health care professionals , documenting information in the medical record, independently interpreting results and communicating that information with the patient/family/caregiver, and care coordination.    Follow Up {Instructions Charge Capture  Follow-up Communications :23}     Patient was given instructions and counseling regarding her condition or for health maintenance advice. Please see specific information pulled into the AVS (placed there by myself) if appropriate.    Return in about 4 weeks (around 9/4/2024) for Recheck bruising.    BMI is within normal parameters. No other follow-up for BMI required.       DELBERT Katz, FNP-BC

## 2024-08-08 LAB
25(OH)D3 SERPL-MCNC: 45.9 NG/ML (ref 30–100)
FERRITIN SERPL-MCNC: 97.9 NG/ML (ref 13–150)
FOLATE SERPL-MCNC: >20 NG/ML (ref 4.78–24.2)
FSH SERPL-ACNC: 27.3 MIU/ML
LH SERPL-ACNC: 14.2 MIU/ML
VIT B12 BLD-MCNC: 495 PG/ML (ref 211–946)

## 2024-08-11 LAB — TESTOST FREE SERPL-MCNC: 0.4 PG/ML (ref 0–4.2)

## 2024-09-22 DIAGNOSIS — J45.909 MODERATE ASTHMA, UNSPECIFIED WHETHER COMPLICATED, UNSPECIFIED WHETHER PERSISTENT: ICD-10-CM

## 2024-09-23 RX ORDER — FLUTICASONE PROPIONATE AND SALMETEROL 100; 50 UG/1; UG/1
POWDER RESPIRATORY (INHALATION) 2 TIMES DAILY
Qty: 60 EACH | Refills: 0 | Status: SHIPPED | OUTPATIENT
Start: 2024-09-23

## 2024-10-22 DIAGNOSIS — J45.909 MODERATE ASTHMA, UNSPECIFIED WHETHER COMPLICATED, UNSPECIFIED WHETHER PERSISTENT: ICD-10-CM

## 2024-10-22 RX ORDER — FLUTICASONE PROPIONATE AND SALMETEROL 100; 50 UG/1; UG/1
POWDER RESPIRATORY (INHALATION) 2 TIMES DAILY
Qty: 60 EACH | Refills: 4 | Status: SHIPPED | OUTPATIENT
Start: 2024-10-22

## 2024-12-13 ENCOUNTER — OFFICE VISIT (OUTPATIENT)
Dept: FAMILY MEDICINE CLINIC | Facility: CLINIC | Age: 49
End: 2024-12-13
Payer: MEDICARE

## 2024-12-13 VITALS
HEART RATE: 72 BPM | OXYGEN SATURATION: 100 % | WEIGHT: 136 LBS | RESPIRATION RATE: 18 BRPM | TEMPERATURE: 98.5 F | BODY MASS INDEX: 24.1 KG/M2 | HEIGHT: 63 IN

## 2024-12-13 DIAGNOSIS — R53.83 OTHER FATIGUE: ICD-10-CM

## 2024-12-13 DIAGNOSIS — J06.9 ACUTE URI: ICD-10-CM

## 2024-12-13 DIAGNOSIS — U07.1 ACUTE COVID-19: Primary | ICD-10-CM

## 2024-12-13 DIAGNOSIS — J10.1 INFLUENZA A: ICD-10-CM

## 2024-12-13 DIAGNOSIS — J02.9 SORE THROAT: ICD-10-CM

## 2024-12-13 DIAGNOSIS — R05.1 ACUTE COUGH: ICD-10-CM

## 2024-12-13 LAB
EXPIRATION DATE: ABNORMAL
EXPIRATION DATE: NORMAL
FLUAV AG UPPER RESP QL IA.RAPID: DETECTED
FLUBV AG UPPER RESP QL IA.RAPID: NOT DETECTED
INTERNAL CONTROL: ABNORMAL
INTERNAL CONTROL: NORMAL
Lab: ABNORMAL
Lab: NORMAL
S PYO AG THROAT QL: NEGATIVE
SARS-COV-2 AG UPPER RESP QL IA.RAPID: DETECTED

## 2024-12-13 PROCEDURE — 99213 OFFICE O/P EST LOW 20 MIN: CPT | Performed by: REGISTERED NURSE

## 2024-12-13 PROCEDURE — 87428 SARSCOV & INF VIR A&B AG IA: CPT | Performed by: REGISTERED NURSE

## 2024-12-13 PROCEDURE — 1160F RVW MEDS BY RX/DR IN RCRD: CPT | Performed by: REGISTERED NURSE

## 2024-12-13 PROCEDURE — 1159F MED LIST DOCD IN RCRD: CPT | Performed by: REGISTERED NURSE

## 2024-12-13 PROCEDURE — 87880 STREP A ASSAY W/OPTIC: CPT | Performed by: REGISTERED NURSE

## 2024-12-13 PROCEDURE — 3044F HG A1C LEVEL LT 7.0%: CPT | Performed by: REGISTERED NURSE

## 2024-12-13 PROCEDURE — 1126F AMNT PAIN NOTED NONE PRSNT: CPT | Performed by: REGISTERED NURSE

## 2025-01-21 ENCOUNTER — OFFICE VISIT (OUTPATIENT)
Dept: FAMILY MEDICINE CLINIC | Facility: CLINIC | Age: 50
End: 2025-01-21
Payer: MEDICARE

## 2025-01-21 VITALS
DIASTOLIC BLOOD PRESSURE: 61 MMHG | OXYGEN SATURATION: 100 % | TEMPERATURE: 97.3 F | SYSTOLIC BLOOD PRESSURE: 111 MMHG | WEIGHT: 145 LBS | RESPIRATION RATE: 17 BRPM | BODY MASS INDEX: 25.69 KG/M2 | HEART RATE: 74 BPM | HEIGHT: 63 IN

## 2025-01-21 DIAGNOSIS — N32.89 BLADDER SPASMS: ICD-10-CM

## 2025-01-21 DIAGNOSIS — Z00.00 ENCOUNTER FOR SUBSEQUENT ANNUAL WELLNESS VISIT (AWV) IN MEDICARE PATIENT: Primary | ICD-10-CM

## 2025-01-21 LAB
BILIRUB BLD-MCNC: NEGATIVE MG/DL
CLARITY, POC: CLEAR
COLOR UR: YELLOW
EXPIRATION DATE: NORMAL
GLUCOSE UR STRIP-MCNC: NEGATIVE MG/DL
KETONES UR QL: NEGATIVE
LEUKOCYTE EST, POC: NEGATIVE
Lab: NORMAL
NITRITE UR-MCNC: NEGATIVE MG/ML
PH UR: 7 [PH] (ref 5–8)
PROT UR STRIP-MCNC: NEGATIVE MG/DL
RBC # UR STRIP: NEGATIVE /UL
SP GR UR: 1.02 (ref 1–1.03)
UROBILINOGEN UR QL: NORMAL

## 2025-01-21 PROCEDURE — 1126F AMNT PAIN NOTED NONE PRSNT: CPT | Performed by: REGISTERED NURSE

## 2025-01-21 PROCEDURE — G0439 PPPS, SUBSEQ VISIT: HCPCS | Performed by: REGISTERED NURSE

## 2025-01-21 PROCEDURE — 1159F MED LIST DOCD IN RCRD: CPT | Performed by: REGISTERED NURSE

## 2025-01-21 PROCEDURE — 3078F DIAST BP <80 MM HG: CPT | Performed by: REGISTERED NURSE

## 2025-01-21 PROCEDURE — 1160F RVW MEDS BY RX/DR IN RCRD: CPT | Performed by: REGISTERED NURSE

## 2025-01-21 PROCEDURE — 81003 URINALYSIS AUTO W/O SCOPE: CPT | Performed by: REGISTERED NURSE

## 2025-01-21 PROCEDURE — 3074F SYST BP LT 130 MM HG: CPT | Performed by: REGISTERED NURSE

## 2025-01-21 PROCEDURE — 1170F FXNL STATUS ASSESSED: CPT | Performed by: REGISTERED NURSE

## 2025-01-21 NOTE — PROGRESS NOTES
The ABCs of the Annual Wellness Visit  Subsequent Medicare Wellness Visit    Subjective    Raya Dejesus is a 49 y.o. patient who presents for a Subsequent Medicare Wellness Visit.      The following portions of the patient's history were reviewed and updated as appropriate: allergies, current medications, past family history, past medical history, past social history, past surgical history, and problem list.    Compared to one year ago, the patient feels her physical health is the same.    Compared to one year ago, the patient feels her mental health is the same.    Recent Hospitalizations:  She was not admitted to the hospital during the last year.     Current Medical Providers:  Patient Care Team:  Mehrdad Rosas APRN as PCP - General (Family Medicine)    Outpatient Medications Prior to Visit   Medication Sig Dispense Refill    aspirin 81 MG chewable tablet Chew 1 tablet Daily.      carvedilol (COREG) 25 MG tablet Take 1 tablet by mouth 2 (Two) Times a Day With Meals.      Entresto 24-26 MG tablet Take 1 tablet by mouth 2 (Two) Times a Day.      fenofibrate (TRICOR) 145 MG tablet Take 1 tablet by mouth Daily. 90 tablet 2    Fluticasone-Salmeterol (ADVAIR/WIXELA) 100-50 MCG/ACT DISKUS INHALE 1 DOSE BY MOUTH TWICE DAILY 60 each 4    furosemide (LASIX) 20 MG tablet Take 1 tablet by mouth As Needed.      meclizine (ANTIVERT) 25 MG tablet Take 1 tablet by mouth 3 (Three) Times a Day As Needed for Dizziness. 30 tablet 1    multivitamin with minerals tablet tablet Take 1 tablet by mouth Daily.      ondansetron ODT (ZOFRAN-ODT) 4 MG disintegrating tablet Place 1 tablet on the tongue Every 8 (Eight) Hours As Needed for Nausea or Vomiting. 30 tablet 1    spironolactone (ALDACTONE) 25 MG tablet Take 1 tablet by mouth Daily.      Ventolin  (90 Base) MCG/ACT inhaler INHALE 2 PUFFS BY MOUTH EVERY 4 HOURS AS NEEDED FOR WHEEZING OR SHORTNESS OF BREATH 36 g 0     No facility-administered medications prior to  "visit.       No opioid medication identified on active medication list. I have reviewed chart for other potential  high risk medication/s and harmful drug interactions in the elderly.        Aspirin is on active medication list. Aspirin use is indicated based on review of current medical condition/s. Pros and cons of this therapy have been discussed today. Benefits of this medication outweigh potential harm.  Patient has been encouraged to continue taking this medication.  .        Patient Active Problem List   Diagnosis    Asthma    Hyperlipidemia    Mild chronic obstructive pulmonary disease    Biventricular ICD (implantable cardioverter-defibrillator) in place    Chronic systolic CHF (congestive heart failure), NYHA class 3    Diabetes type 2, controlled    Dilated cardiomyopathy    Benign essential HTN    Fatigue    Hx of mitral valve prolapse    LBBB (left bundle branch block)    Migraine without aura    Mitral valve disorder    Neuropathy    Obstructive sleep apnea    Overweight    Restless legs    Shoulder joint pain    Ulcer, stomach peptic    Pulmonary emphysema       Advance Care Planning  Advance Directive is not on file.  ACP discussion was held with the patient during this visit. Patient does not have an advance directive, declines further assistance.      Objective    Vitals:    01/21/25 1354   BP: 111/61   BP Location: Left arm   Patient Position: Sitting   Cuff Size: Adult   Pulse: 74   Resp: 17   Temp: 97.3 °F (36.3 °C)   TempSrc: Infrared   SpO2: 100%   Weight: 65.8 kg (145 lb)   Height: 160 cm (63\")   PainSc: 0-No pain     Estimated body mass index is 25.69 kg/m² as calculated from the following:    Height as of this encounter: 160 cm (63\").    Weight as of this encounter: 65.8 kg (145 lb).    BMI is >= 25 and <30. (Overweight) The following options were offered after discussion;: exercise counseling/recommendations and nutrition counseling/recommendations      Does the patient have evidence of " cognitive impairment? No          HEALTH RISK ASSESSMENT    Smoking Status:  Social History     Tobacco Use   Smoking Status Former    Current packs/day: 0.00    Average packs/day: 2.0 packs/day for 19.5 years (39.0 ttl pk-yrs)    Types: Cigarettes    Start date: 1991    Quit date: 2010    Years since quittin.5    Passive exposure: Past   Smokeless Tobacco Never     Alcohol Consumption:  Social History     Substance and Sexual Activity   Alcohol Use Not Currently     Fall Risk Screen:    AMADA Fall Risk Assessment was completed, and patient is at LOW risk for falls.Assessment completed on:2025    Depression Screenin/21/2025     2:03 PM   PHQ-2/PHQ-9 Depression Screening   Little interest or pleasure in doing things Not at all   Feeling down, depressed, or hopeless Not at all   How difficult have these problems made it for you to do your work, take care of things at home, or get along with other people? Not difficult at all       Health Habits and Functional and Cognitive Screenin/21/2025     2:04 PM   Functional & Cognitive Status   Do you have difficulty preparing food and eating? No   Do you have difficulty bathing yourself, getting dressed or grooming yourself? No   Do you have difficulty using the toilet? No   Do you have difficulty moving around from place to place? No   Do you have trouble with steps or getting out of a bed or a chair? No   Current Diet Well Balanced Diet   Dental Exam Unknown, record requested   Eye Exam Unknown, record requested   Exercise (times per week) 0 times per week   Current Exercises Include No Regular Exercise   Do you need help using the phone?  No   Are you deaf or do you have serious difficulty hearing?  No   Do you need help to go to places out of walking distance? No   Do you need help shopping? No   Do you need help preparing meals?  No   Do you need help with housework?  No   Do you need help with laundry? No   Do you need help taking  your medications? No   Do you need help managing money? No   Do you ever drive or ride in a car without wearing a seat belt? No   Have you felt unusual stress, anger or loneliness in the last month? No   Who do you live with? Alone   If you need help, do you have trouble finding someone available to you? No   Have you been bothered in the last four weeks by sexual problems? No   Do you have difficulty concentrating, remembering or making decisions? No       Age-appropriate Screening Schedule:  Refer to the list below for future screening recommendations based on patient's age, sex and/or medical conditions. Orders for these recommended tests are listed in the plan section. The patient has been provided with a written plan.    Health Maintenance   Topic Date Due    DIABETIC EYE EXAM  02/01/2024    ANNUAL WELLNESS VISIT  01/02/2025    HEMOGLOBIN A1C  02/02/2025    COVID-19 Vaccine (4 - 2024-25 season) 03/04/2025 (Originally 9/1/2024)    INFLUENZA VACCINE  03/31/2025 (Originally 7/1/2024)    Hepatitis B (1 of 3 - 19+ 3-dose series) 12/13/2025 (Originally 1/26/1995)    Pneumococcal Vaccine 0-64 (1 of 2 - PCV) 12/13/2025 (Originally 1/26/1982)    PAP SMEAR  04/18/2025    LIPID PANEL  08/02/2025    MAMMOGRAM  09/27/2025    BMI FOLLOWUP  01/21/2026    COLORECTAL CANCER SCREENING  11/08/2032    TDAP/TD VACCINES (2 - Td or Tdap) 10/29/2034    HEPATITIS C SCREENING  Completed    URINE MICROALBUMIN  Discontinued                CMS Preventative Services Quick Reference  Risk Factors Identified During Encounter  Fall Risk-High or Moderate: Discussed Fall Prevention in the home, Information on Fall Prevention Shared in After Visit Summary, and Sit to Stand Exercise Information Shared in After Visit Summary  The above risks/problems have been discussed with the patient.  Pertinent information has been shared with the patient in the After Visit Summary.  An After Visit Summary and PPPS were made available to the  "patient.      History of Present Illness  The patient presents for a Medicare wellness visit.    She reports no significant changes in her physical or mental health status compared to the previous year. She has not required any hospital admissions within the past year. She does not have a written advanced care plan in place but has communicated her wishes verbally. She expresses no concerns regarding her cognitive function. She is not seeking any additional information at this time. She does not believe she requires any laboratory tests today. Her current medication regimen includes daily baby aspirin, as previously prescribed.    She is considering the use of B12 supplements to enhance her energy levels and is seeking advice on their safety.    She has been experiencing weight gain, with her weight increasing from 130 to 145 pounds, despite adhering to a diet devoid of carbohydrates and sugars. She is seeking further guidance on potential interventions for weight management.    MEDICATIONS  aspirin       Review of Systems:  Review of Systems   Constitutional: Negative.  Negative for chills and fever.   HENT: Negative.  Negative for congestion.    Eyes: Negative.    Respiratory: Negative.  Negative for cough and shortness of breath.    Cardiovascular: Negative.  Negative for chest pain, palpitations and leg swelling.   Gastrointestinal: Negative.  Negative for blood in stool, constipation and diarrhea.   Endocrine: Negative.    Genitourinary: Negative.  Negative for difficulty urinating and hematuria.   Musculoskeletal: Negative.    Skin: Negative.    Allergic/Immunologic: Negative.    Neurological: Negative.  Negative for dizziness, headache and confusion.   Hematological: Negative.    Psychiatric/Behavioral: Negative.          Objective   Vital Signs:  /61 (BP Location: Left arm, Patient Position: Sitting, Cuff Size: Adult)   Pulse 74   Temp 97.3 °F (36.3 °C) (Infrared)   Resp 17   Ht 160 cm (63\")   Wt " "65.8 kg (145 lb)   SpO2 100%   BMI 25.69 kg/m²     Physical Exam  Constitutional:       Appearance: Normal appearance. She is normal weight.   HENT:      Head: Normocephalic.      Nose: No congestion or rhinorrhea.   Pulmonary:      Effort: No respiratory distress.   Abdominal:      General: Abdomen is flat.   Neurological:      General: No focal deficit present.      Mental Status: She is alert and oriented to person, place, and time.   Psychiatric:         Mood and Affect: Mood normal.         Behavior: Behavior normal.         Thought Content: Thought content normal.         Judgment: Judgment normal.       The following data was reviewed by DELBERT Katz on 01/21/2025.  Lab Results   Component Value Date    WBC 5.60 08/02/2024    HGB 13.0 08/02/2024    HCT 41.1 08/02/2024    MCV 94.9 08/02/2024     08/02/2024     [unfilled]  Lab Results   Component Value Date    CHOL 126 08/02/2024    CHLPL 198 11/16/2019    TRIG 101 08/02/2024    HDL 37 (L) 08/02/2024    LDL 70 08/02/2024     Lab Results   Component Value Date    TSH 2.190 08/02/2024     Lab Results   Component Value Date    HGBA1C 5.34 08/02/2024     No results found for: \"PSA\"              Assessment and Plan:       Diagnoses and all orders for this visit:    1. Encounter for subsequent annual wellness visit (AWV) in Medicare patient (Primary)    2. Bladder spasms  -     POCT urinalysis dipstick, automated                  Assessment & Plan  1. Medicare wellness visit.  Her kidney and liver functions are within normal limits based on the most recent lab results from January. Her cholesterol and thyroid levels are also satisfactory. She was advised to establish a written advanced care plan to ensure her healthcare preferences are respected and followed. She was educated about the potential signs of dementia and Alzheimer's disease, and it was recommended that she seek further evaluation if she experiences any significant memory loss. " A fall prevention guide was provided for her reference.    2. Weight management.  She reported a weight gain from 130 to 145 pounds despite avoiding carbs and sugar. It was discussed that Medicare does not cover weight loss medications unless there is a history of heart attack. She was advised to continue monitoring her carbohydrate intake and consider other lifestyle modifications to manage her weight.    3. Vitamin B12 supplementation.  She inquired about the safety of taking B12 supplements to boost her energy. It was explained that taking B12 supplements is generally safe as long as she does not exceed the recommended dosage. Excess B12 is typically excreted in the urine, especially if her kidneys are functioning well.      Follow Up  Return in about 3 months (around 4/21/2025) for routine follow up.    Patient was given instructions and counseling regarding her condition or for health maintenance advice. Please see specific information pulled into the AVS if appropriate.     Sit-to-Stand Exercise    The sit-to-stand exercise (also known as the chair stand or chair rise exercise) strengthens your lower body and helps you maintain or improve your mobility and independence. The goal is to do the sit-to-stand exercise without using your hands. This will be easier as you become stronger. You should always talk with your health care provider before starting any exercise program, especially if you have had recent surgery.  Do the exercise exactly as told by your health care provider and adjust it as directed. It is normal to feel mild stretching, pulling, tightness, or discomfort as you do this exercise, but you should stop right away if you feel sudden pain or your pain gets worse. Do not begin doing this exercise until told by your health care provider.  What the sit-to-stand exercise does  The sit-to-stand exercise helps to strengthen the muscles in your thighs and the muscles in the center of your body that give  you stability (core muscles). This exercise is especially helpful if:  You have had knee or hip surgery.  You have trouble getting up from a chair, out of a car, or off the toilet.  How to do the sit-to-stand exercise  Sit toward the front edge of a sturdy chair without armrests. Your knees should be bent and your feet should be flat on the floor and shoulder-width apart.  Place your hands lightly on each side of the seat. Keep your back and neck as straight as possible, with your chest slightly forward.  Breathe in slowly. Lean forward and slightly shift your weight to the front of your feet.  Breathe out as you slowly stand up. Use your hands as little as possible.  Stand and pause for a full breath in and out.  Breathe in as you sit down slowly. Tighten your core and abdominal muscles to control your lowering as much as possible.  Breathe out slowly.  Do this exercise 10-15 times. If needed, do it fewer times until you build up strength.  Rest for 1 minute, then do another set of 10-15 repetitions.  To change the difficulty of the sit-to-stand exercise  If the exercise is too difficult, use a chair with sturdy armrests, and push off the armrests to help you come to the standing position. You can also use the armrests to help slowly lower yourself back to sitting. As this gets easier, try to use your arms less. You can also place a firm cushion or pillow on the chair to make the surface higher.  If this exercise is too easy, do not use your arms to help raise or lower yourself. You can also wear a weighted vest, use hand weights, increase your repetitions, or try a lower chair.  General tips  You may feel tired when starting an exercise routine. This is normal.  You may have muscle soreness that lasts a few days. This is normal. As you get stronger, you may not feel muscle soreness.  Use smooth, steady movements.  Do not  hold your breath during strength exercises. This can cause unsafe changes in your blood  pressure.  Breathe in slowly through your nose, and breathe out slowly through your mouth.  Summary  Strengthening your lower body is an important step to help you move safely and independently.  The sit-to-stand exercise helps strengthen the muscles in your thighs and core.  You should always talk with your health care provider before starting any exercise program, especially if you have had recent surgery.  This information is not intended to replace advice given to you by your health care provider. Make sure you discuss any questions you have with your health care provider.  Document Revised: 10/16/2019 Document Reviewed: 02/08/2018  [email or web address] Patient Education © 2021 [employer]    Advance Care Planning and Advance Directives     You make decisions on a daily basis - decisions about where you want to live, your career, your home, your life. Perhaps one of the most important decisions you face is your choice for future medical care. Take time to talk with your family and your healthcare team and start planning today.  Advance Care Planning is a process that can help you:  Understand possible future healthcare decisions in light of your own experiences  Reflect on those decision in light of your goals and values  Discuss your decisions with those closest to you and the healthcare professionals that care for you  Make a plan by creating a document that reflects your wishes    Surrogate Decision Maker  In the event of a medical emergency, which has left you unable to communicate or to make your own decisions, you would need someone to make decisions for you.  It is important to discuss your preferences for medical treatment with this person while you are in good health.     Qualities of a surrogate decision maker:  Willing to take on this role and responsibility  Knows what you want for future medical care  Willing to follow your wishes even if they don't agree with them  Able to make difficult medical decisions  under stressful circumstances    Advance Directives  These are legal documents you can create that will guide your healthcare team and decision maker(s) when needed. These documents can be stored in the electronic medical record.    Living Will - a legal document to guide your care if you have a terminal condition or a serious illness and are unable to communicate. States vary by statute in document names/types, but most forms may include one or more of the following:        -  Directions regarding life-prolonging treatments        -  Directions regarding artificially provided nutrition/hydration        -  Choosing a healthcare decision maker        -  Direction regarding organ/tissue donation    Durable Power of  for Healthcare - this document names an -in-fact to make medical decisions for you, but it may also allow this person to make personal and financial decisions for you. Please seek the advice of an  if you need this type of document.    **Advance Directives are not required and no one may discriminate against you if you do not sign one.    Medical Orders  Many states allow specific forms/orders signed by your physician to record your wishes for medical treatment in your current state of health. This form, signed in personal communication with your physician, addresses resuscitation and other medical interventions that you may or may not want.  For more information or to schedule a time with a [facility] Advance Care Planning Facilitator contact: [email or web address] or call [phone] and someone will contact you directly.    Fall Prevention in the Home, Adult  Falls can cause injuries and can happen to people of all ages. There are many things you can do to make your home safe and to help prevent falls. Ask for help when making these changes.  What actions can I take to prevent falls?  General Instructions  Use good lighting in all rooms. Replace any light bulbs that burn  out.  Turn on the lights in dark areas. Use night-lights.  Keep items that you use often in easy-to-reach places. Lower the shelves around your home if needed.  Set up your furniture so you have a clear path. Avoid moving your furniture around.  Do not have throw rugs or other things on the floor that can make you trip.  Avoid walking on wet floors.  If any of your floors are uneven, fix them.  Add color or contrast paint or tape to clearly anju and help you see:  Grab bars or handrails.  First and last steps of staircases.  Where the edge of each step is.  If you use a stepladder:  Make sure that it is fully opened. Do not climb a closed stepladder.  Make sure the sides of the stepladder are locked in place.  Ask someone to hold the stepladder while you use it.  Know where your pets are when moving through your home.  What can I do in the bathroom?         Keep the floor dry. Clean up any water on the floor right away.  Remove soap buildup in the tub or shower.  Use nonskid mats or decals on the floor of the tub or shower.  Attach bath mats securely with double-sided, nonslip rug tape.  If you need to sit down in the shower, use a plastic, nonslip stool.  Install grab bars by the toilet and in the tub and shower. Do not use towel bars as grab bars.  What can I do in the bedroom?  Make sure that you have a light by your bed that is easy to reach.  Do not use any sheets or blankets for your bed that hang to the floor.  Have a firm chair with side arms that you can use for support when you get dressed.  What can I do in the kitchen?  Clean up any spills right away.  If you need to reach something above you, use a step stool with a grab bar.  Keep electrical cords out of the way.  Do not use floor polish or wax that makes floors slippery.  What can I do with my stairs?  Do not leave any items on the stairs.  Make sure that you have a light switch at the top and the bottom of the stairs.  Make sure that there are  handrails on both sides of the stairs. Fix handrails that are broken or loose.  Install nonslip stair treads on all your stairs.  Avoid having throw rugs at the top or bottom of the stairs.  Choose a carpet that does not hide the edge of the steps on the stairs.  Check carpeting to make sure that it is firmly attached to the stairs. Fix carpet that is loose or worn.  What can I do on the outside of my home?  Use bright outdoor lighting.  Fix the edges of walkways and driveways and fix any cracks.  Remove anything that might make you trip as you walk through a door, such as a raised step or threshold.  Trim any bushes or trees on paths to your home.  Check to see if handrails are loose or broken and that both sides of all steps have handrails.  Install guardrails along the edges of any raised decks and porches.  Clear paths of anything that can make you trip, such as tools or rocks.  Have leaves, snow, or ice cleared regularly.  Use sand or salt on paths during winter.  Clean up any spills in your garage right away. This includes grease or oil spills.  What other actions can I take?  Wear shoes that:  Have a low heel. Do not wear high heels.  Have rubber bottoms.  Feel good on your feet and fit well.  Are closed at the toe. Do not wear open-toe sandals.  Use tools that help you move around if needed. These include:  Canes.  Walkers.  Scooters.  Crutches.  Review your medicines with your doctor. Some medicines can make you feel dizzy. This can increase your chance of falling.  Ask your doctor what else you can do to help prevent falls.  Where to find more information  Centers for Disease Control and Prevention, [place]: www.cdc.gov  National [place] on Aging: www.[place].nih.gov  Contact a doctor if:  You are afraid of falling at home.  You feel weak, drowsy, or dizzy at home.  You fall at home.  Summary  There are many simple things that you can do to make your home safe and to help prevent falls.  Ways to make your  home safe include removing things that can make you trip and installing grab bars in the bathroom.  Ask for help when making these changes in your home.  This information is not intended to replace advice given to you by your health care provider. Make sure you discuss any questions you have with your health care provider.  Document Revised: 07/21/2021 Document Reviewed: 07/21/2021  [employer] Patient Education © 2021 [employer]        Assessment & Plan         DELBERT Katz, [facility]   01/21/25  15:22 EST     Patient or patient representative verbalized consent for the use of Ambient Listening during the visit with  DELBERT Katz for chart documentation. 1/21/2025  15:22 EST

## 2025-01-29 DIAGNOSIS — E78.2 MIXED HYPERLIPIDEMIA: ICD-10-CM

## 2025-01-29 RX ORDER — FENOFIBRATE 145 MG/1
145 TABLET, COATED ORAL DAILY
Qty: 90 TABLET | Refills: 0 | Status: SHIPPED | OUTPATIENT
Start: 2025-01-29

## 2025-03-19 DIAGNOSIS — J45.909 MODERATE ASTHMA, UNSPECIFIED WHETHER COMPLICATED, UNSPECIFIED WHETHER PERSISTENT: ICD-10-CM

## 2025-03-19 RX ORDER — FLUTICASONE PROPIONATE AND SALMETEROL 100; 50 UG/1; UG/1
POWDER RESPIRATORY (INHALATION) 2 TIMES DAILY
Qty: 60 EACH | Refills: 0 | Status: SHIPPED | OUTPATIENT
Start: 2025-03-19

## 2025-04-15 DIAGNOSIS — J45.909 MODERATE ASTHMA, UNSPECIFIED WHETHER COMPLICATED, UNSPECIFIED WHETHER PERSISTENT: ICD-10-CM

## 2025-04-15 RX ORDER — FLUTICASONE PROPIONATE AND SALMETEROL 100; 50 UG/1; UG/1
POWDER RESPIRATORY (INHALATION) 2 TIMES DAILY
Qty: 60 EACH | Refills: 0 | Status: SHIPPED | OUTPATIENT
Start: 2025-04-15

## 2025-04-16 ENCOUNTER — TELEPHONE (OUTPATIENT)
Dept: FAMILY MEDICINE CLINIC | Facility: CLINIC | Age: 50
End: 2025-04-16
Payer: MEDICARE

## 2025-04-16 NOTE — TELEPHONE ENCOUNTER
Caller: Raya Dejesus    Relationship: Self    Best call back number: [phone]    What orders are you requesting (i.e. lab or imaging): LAB ORDERS    In what timeframe would the patient need to come in: ASAP    Where will you receive your lab/imaging services: IN OFFICE     Additional notes: PT STATES SHE WAS SUPPOSED TO COME BACK IN 3 MONTHS FOR LABS PRIOR TO F/U.

## 2025-04-28 DIAGNOSIS — E78.2 MIXED HYPERLIPIDEMIA: ICD-10-CM

## 2025-04-28 RX ORDER — FENOFIBRATE 145 MG/1
145 TABLET, FILM COATED ORAL DAILY
Qty: 30 TABLET | Refills: 0 | Status: SHIPPED | OUTPATIENT
Start: 2025-04-28

## 2025-04-29 ENCOUNTER — OFFICE (OUTPATIENT)
Dept: URBAN - METROPOLITAN AREA CLINIC 64 | Facility: CLINIC | Age: 50
End: 2025-04-29
Payer: MEDICARE

## 2025-04-29 VITALS
HEIGHT: 63 IN | DIASTOLIC BLOOD PRESSURE: 63 MMHG | SYSTOLIC BLOOD PRESSURE: 100 MMHG | WEIGHT: 148 LBS | HEART RATE: 82 BPM

## 2025-04-29 DIAGNOSIS — K75.81 NONALCOHOLIC STEATOHEPATITIS (NASH): ICD-10-CM

## 2025-04-29 DIAGNOSIS — K21.9 GASTRO-ESOPHAGEAL REFLUX DISEASE WITHOUT ESOPHAGITIS: ICD-10-CM

## 2025-04-29 DIAGNOSIS — K59.00 CONSTIPATION, UNSPECIFIED: ICD-10-CM

## 2025-04-29 DIAGNOSIS — R11.2 NAUSEA WITH VOMITING, UNSPECIFIED: ICD-10-CM

## 2025-04-29 PROCEDURE — 99214 OFFICE O/P EST MOD 30 MIN: CPT | Performed by: NURSE PRACTITIONER

## 2025-04-29 RX ORDER — LINACLOTIDE 72 UG/1
CAPSULE, GELATIN COATED ORAL
Qty: 30 | Refills: 3 | Status: ACTIVE
Start: 2025-04-29

## 2025-04-29 RX ORDER — FAMOTIDINE 40 MG/1
40 TABLET, FILM COATED ORAL
Qty: 30 | Refills: 11 | Status: ACTIVE
Start: 2025-04-29

## 2025-04-29 RX ORDER — PANTOPRAZOLE SODIUM 40 MG/1
TABLET, DELAYED RELEASE ORAL
Qty: 30 | Refills: 11 | Status: ACTIVE
Start: 2025-04-29

## 2025-04-30 ENCOUNTER — TELEPHONE (OUTPATIENT)
Dept: FAMILY MEDICINE CLINIC | Facility: CLINIC | Age: 50
End: 2025-04-30
Payer: MEDICARE

## 2025-04-30 ENCOUNTER — CLINICAL SUPPORT (OUTPATIENT)
Dept: FAMILY MEDICINE CLINIC | Facility: CLINIC | Age: 50
End: 2025-04-30
Payer: MEDICARE

## 2025-04-30 DIAGNOSIS — E11.65 CONTROLLED TYPE 2 DIABETES MELLITUS WITH HYPERGLYCEMIA, UNSPECIFIED WHETHER LONG TERM INSULIN USE: ICD-10-CM

## 2025-04-30 DIAGNOSIS — Z13.29 SCREENING FOR THYROID DISORDER: ICD-10-CM

## 2025-04-30 DIAGNOSIS — E78.2 MIXED HYPERLIPIDEMIA: Primary | ICD-10-CM

## 2025-04-30 PROCEDURE — 84443 ASSAY THYROID STIM HORMONE: CPT | Performed by: REGISTERED NURSE

## 2025-04-30 PROCEDURE — 36415 COLL VENOUS BLD VENIPUNCTURE: CPT | Performed by: REGISTERED NURSE

## 2025-04-30 PROCEDURE — 80061 LIPID PANEL: CPT | Performed by: REGISTERED NURSE

## 2025-04-30 PROCEDURE — 82043 UR ALBUMIN QUANTITATIVE: CPT | Performed by: REGISTERED NURSE

## 2025-04-30 PROCEDURE — 83036 HEMOGLOBIN GLYCOSYLATED A1C: CPT | Performed by: REGISTERED NURSE

## 2025-04-30 PROCEDURE — 82570 ASSAY OF URINE CREATININE: CPT | Performed by: REGISTERED NURSE

## 2025-04-30 NOTE — PROGRESS NOTES
Venipuncture Blood Specimen Collection  Venipuncture performed in RT ARM by Alexi Martinez with good hemostasis. Patient tolerated the procedure well without complications.   04/30/25   Alexi Martinez

## 2025-04-30 NOTE — TELEPHONE ENCOUNTER
HUB to relay description below.      LVM FOR PT TO CALL OFFICE AND RS JOAO THAT'S ON FOR 05-07-25 SCHEDULE NEXT AVAILABLE WITH KELSEY ONLY HE WILL BE OUT OF OFFICE THAT DAY THANK YOU

## 2025-05-01 LAB
ALBUMIN UR-MCNC: <1.2 MG/DL
CHOLEST SERPL-MCNC: 234 MG/DL (ref 0–200)
CREAT UR-MCNC: 97.7 MG/DL
HBA1C MFR BLD: 5.7 % (ref 4.8–5.6)
HDLC SERPL-MCNC: 40 MG/DL (ref 40–60)
LDLC SERPL CALC-MCNC: 160 MG/DL (ref 0–100)
LDLC/HDLC SERPL: 3.93 {RATIO}
MICROALBUMIN/CREAT UR: NORMAL MG/G{CREAT}
TRIGL SERPL-MCNC: 184 MG/DL (ref 0–150)
TSH SERPL DL<=0.05 MIU/L-ACNC: 2.77 UIU/ML (ref 0.27–4.2)
VLDLC SERPL-MCNC: 34 MG/DL (ref 5–40)

## 2025-05-12 DIAGNOSIS — J45.909 MODERATE ASTHMA, UNSPECIFIED WHETHER COMPLICATED, UNSPECIFIED WHETHER PERSISTENT: ICD-10-CM

## 2025-05-12 RX ORDER — FLUTICASONE PROPIONATE AND SALMETEROL 100; 50 UG/1; UG/1
POWDER RESPIRATORY (INHALATION) 2 TIMES DAILY
Qty: 60 EACH | Refills: 0 | Status: SHIPPED | OUTPATIENT
Start: 2025-05-12

## 2025-05-13 ENCOUNTER — OFFICE VISIT (OUTPATIENT)
Dept: FAMILY MEDICINE CLINIC | Facility: CLINIC | Age: 50
End: 2025-05-13
Payer: MEDICARE

## 2025-05-13 VITALS
DIASTOLIC BLOOD PRESSURE: 75 MMHG | BODY MASS INDEX: 26.58 KG/M2 | TEMPERATURE: 97.5 F | SYSTOLIC BLOOD PRESSURE: 110 MMHG | WEIGHT: 150 LBS | OXYGEN SATURATION: 99 % | HEIGHT: 63 IN | HEART RATE: 72 BPM | RESPIRATION RATE: 18 BRPM

## 2025-05-13 DIAGNOSIS — J43.9 PULMONARY EMPHYSEMA, UNSPECIFIED EMPHYSEMA TYPE: ICD-10-CM

## 2025-05-13 DIAGNOSIS — I10 BENIGN ESSENTIAL HTN: ICD-10-CM

## 2025-05-13 DIAGNOSIS — G62.9 PERIPHERAL POLYNEUROPATHY: ICD-10-CM

## 2025-05-13 DIAGNOSIS — E11.65 CONTROLLED TYPE 2 DIABETES MELLITUS WITH HYPERGLYCEMIA, WITHOUT LONG-TERM CURRENT USE OF INSULIN: ICD-10-CM

## 2025-05-13 DIAGNOSIS — E11.9 ENCOUNTER FOR DIABETIC FOOT EXAM: ICD-10-CM

## 2025-05-13 DIAGNOSIS — E78.2 MIXED HYPERLIPIDEMIA: Primary | ICD-10-CM

## 2025-05-13 RX ORDER — LINACLOTIDE 72 UG/1
72 CAPSULE, GELATIN COATED ORAL
COMMUNITY
Start: 2025-04-29

## 2025-05-13 RX ORDER — EZETIMIBE 10 MG/1
10 TABLET ORAL DAILY
Qty: 90 TABLET | Refills: 1 | Status: SHIPPED | OUTPATIENT
Start: 2025-05-13

## 2025-05-13 RX ORDER — FAMOTIDINE 40 MG/1
40 TABLET, FILM COATED ORAL
COMMUNITY
Start: 2025-04-29

## 2025-05-13 RX ORDER — GABAPENTIN 100 MG/1
100 CAPSULE ORAL 2 TIMES DAILY
Qty: 60 CAPSULE | Refills: 1 | Status: SHIPPED | OUTPATIENT
Start: 2025-05-13

## 2025-05-13 RX ORDER — PANTOPRAZOLE SODIUM 40 MG/1
40 TABLET, DELAYED RELEASE ORAL DAILY
COMMUNITY
Start: 2025-04-29

## 2025-05-13 NOTE — PROGRESS NOTES
Chief Complaint  lab f/u and Diabetes    Subjective    History of Present Illness {CC  Problem List  Visit  Diagnosis   Encounters  Notes  Medications  Labs  Result Review Imaging  Media :23}     Raya Dejesus presents to [facility] for lab f/u and Diabetes.      History of Present Illness  Patient is a 50 y.o. female who presents to the clinic today for 3-month follow-up for type 2 diabetes with peripheral neuropathy hypertension, hyperlipidemia, emphysema, and concerns of weight gain over the last couple months.  Patient denies any chest pain, shortness of breath, or any fevers.  Patient denies any known exposure to COVID, flu, or any other contagious illnesses.       History of Present Illness  In regards to type 2 diabetes, patient is currently managing her type 2 diabetes with diet.  She denies any issues with her dietary intake for diabetes but does share that her peripheral neuropathy has worsened.  She reports experiencing reflexive leg jerking at night, akin to restless legs syndrome she shares. She has not been attentive to the presence or absence of sensation in her heels.  But when doing for diabetic foot exam she does not report feeling sensation in either heel and part of the lateral left foot pad.  She does report that she continues to have numbness and tingling in bilateral feet.  She was previously diagnosed with peripheral neuropathy and was prescribed gabapentin, but she does not recall its efficacy.  She is willing to try a low-dose option for gabapentin again to see if it helps with her discomfort in bilateral legs.  She denies any concerns with her type 2 diabetes at this time.    In regards to hypertension, patient's blood pressure today is well-managed at 110/75.  She is under the care of a [place] cardiologist, Yanci Moreno, for her hypertension management. Her current medication regimen includes carvedilol and spironolactone. She also takes  Entresto for CHF management.  Patient denies any concerns with her hypertension or hypertension medication at this time.    In regards to hyperlipidemia, she continues to take fenofibrate to manage her hyperlipidemia.  Her last lipid panel a couple weeks ago was very elevated and shows that her cholesterol is not well-controlled with just fenofibrate.  She has not tolerated statins in the past and does not want to take them she shares.  We discussed options and patient is agreeable with starting Zetia, as a nonstatin alternative to help with her cholesterol reduction.  She has been educated and thoroughly advised to avoid extra cholesterol at this time and her dietary intake.  She denies any further concerns with her hyperlipidemia or hyperlipidemia medication at this time.    In regards to emphysema, she continues to take Advair daily and has a Ventolin inhaler if needed.  She denies any concerns or issues with her emphysema or Ventolin at this time.    In regards to reported weight gain, voices that she has noticed weight gain and expresses a desire to return to her previous weight of 135 pounds, a state in which she felt significantly better. She is uncertain about her insurance coverage for weight loss supplements.  Patient will investigate what insurance covers and reach back out to me for options.       Review of Systems   Constitutional:  Positive for unexpected weight change. Negative for activity change, chills, fatigue and fever.   HENT: Negative.  Negative for congestion, dental problem, ear pain, hearing loss, rhinorrhea, sinus pain, sore throat, tinnitus and trouble swallowing.    Eyes: Negative.  Negative for pain and visual disturbance.   Respiratory: Negative.  Negative for cough, chest tightness, shortness of breath and wheezing.    Cardiovascular: Negative.  Negative for chest pain, palpitations and leg swelling.   Gastrointestinal: Negative.  Negative for abdominal pain, diarrhea, nausea and  "vomiting.   Endocrine: Negative.  Negative for polydipsia, polyphagia and polyuria.   Genitourinary: Negative.  Negative for difficulty urinating, dysuria, frequency and urgency.   Musculoskeletal: Negative.  Negative for arthralgias, back pain and myalgias.   Skin: Negative.  Negative for color change, pallor, rash and wound.   Allergic/Immunologic: Negative.  Negative for environmental allergies.   Neurological:  Positive for numbness. Negative for dizziness, speech difficulty, weakness, light-headedness and headaches.   Hematological: Negative.    Psychiatric/Behavioral: Negative.  Negative for confusion, decreased concentration, self-injury and suicidal ideas. The patient is not nervous/anxious.    All other systems reviewed and are negative.       Objective     Vital Signs:   /75 (BP Location: Left arm, Patient Position: Sitting, Cuff Size: Adult)   Pulse 72   Temp 97.5 °F (36.4 °C) (Temporal)   Resp 18   Ht 160 cm (63\")   Wt 68 kg (150 lb)   SpO2 99%   BMI 26.57 kg/m²   Current Outpatient Medications on File Prior to Visit   Medication Sig Dispense Refill    aspirin 81 MG chewable tablet Chew 1 tablet Daily.      carvedilol (COREG) 25 MG tablet Take 1 tablet by mouth 2 (Two) Times a Day With Meals.      Entresto 24-26 MG tablet Take 1 tablet by mouth 2 (Two) Times a Day.      famotidine (PEPCID) 40 MG tablet Take 1 tablet by mouth every night at bedtime.      fenofibrate (TRICOR) 145 MG tablet Take 1 tablet by mouth once daily 30 tablet 0    Fluticasone-Salmeterol (ADVAIR/WIXELA) 100-50 MCG/ACT DISKUS INHALE 1 DOSE BY MOUTH TWICE DAILY 60 each 0    furosemide (LASIX) 20 MG tablet Take 1 tablet by mouth As Needed.      Linzess 72 MCG capsule capsule Take 1 capsule by mouth Every Morning Before Breakfast.      meclizine (ANTIVERT) 25 MG tablet Take 1 tablet by mouth 3 (Three) Times a Day As Needed for Dizziness. 30 tablet 1    multivitamin with minerals tablet tablet Take 1 tablet by mouth Daily.   "    ondansetron ODT (ZOFRAN-ODT) 4 MG disintegrating tablet Place 1 tablet on the tongue Every 8 (Eight) Hours As Needed for Nausea or Vomiting. 30 tablet 1    pantoprazole (PROTONIX) 40 MG EC tablet Take 1 tablet by mouth Daily.      spironolactone (ALDACTONE) 25 MG tablet Take 1 tablet by mouth Daily.      Ventolin  (90 Base) MCG/ACT inhaler INHALE 2 PUFFS BY MOUTH EVERY 4 HOURS AS NEEDED FOR WHEEZING OR SHORTNESS OF BREATH 36 g 0     No current facility-administered medications on file prior to visit.        Past Medical History:   Diagnosis Date    Allergic Several since i was young    Have several allergies to medicine start from a young age    Arthritis 2008    Asthma 2018    Copd since 2018    Cardiomyopathy     CHF (congestive heart failure) 2018    Several heart condition    COPD (chronic obstructive pulmonary disease)     Coronary artery disease 2017    Diabetes mellitus 2019    GERD (gastroesophageal reflux disease)     Heart disease     History of medical problems     Hyperlipidemia     Hypertension     ICD (implantable cardioverter-defibrillator) battery depletion     Injury of back Sept 2021    Lower back pain like a pinch nerve    LBBB (left bundle branch block)     Low back pain     Lower back pain that is always their    MVP (mitral valve prolapse)     KONG (nonalcoholic steatohepatitis)     Neuropathy     Shortness of breath 2018    Copd    UTI (urinary tract infection) 07/26/2022      Past Surgical History:   Procedure Laterality Date    CARDIAC CATHETERIZATION      CHOLECYSTECTOMY  2008    COLON SURGERY  2008    6 inches of the bowels removes    COLONOSCOPY  2022    Negative    ENDOMETRIAL ABLATION      ENDOSCOPY  11/2022    Negative    TONSILLECTOMY  1980    TUBAL ABDOMINAL LIGATION        Family History   Problem Relation Age of Onset    Ovarian cancer Mother     Breast cancer Mother     Cancer Mother     Heart disease Father     Alcohol abuse Father     Arthritis Father     COPD Father      Diabetes Father     Hyperlipidemia Father     Ovarian cancer Maternal Grandmother     Heart disease Maternal Grandmother     Diabetes Maternal Grandmother     Hypertension Maternal Grandmother     Ovarian cancer Paternal Grandmother     Heart disease Paternal Grandmother     Thyroid disease Paternal Grandmother     Diabetes Paternal Grandmother     Stroke Paternal Grandmother     Breast cancer Maternal Aunt     Breast cancer Paternal Aunt       Social History     Socioeconomic History    Marital status:    Tobacco Use    Smoking status: Former     Current packs/day: 0.00     Average packs/day: 2.0 packs/day for 19.5 years (39.0 ttl pk-yrs)     Types: Cigarettes     Start date: 1991     Quit date: 2010     Years since quittin.8     Passive exposure: Past    Smokeless tobacco: Never   Vaping Use    Vaping status: Never Used   Substance and Sexual Activity    Alcohol use: Not Currently    Drug use: Never    Sexual activity: Not Currently     Partners: Male     Birth control/protection: None         Clinical Support on 2025   Component Date Value Ref Range Status    Total Cholesterol 2025 234 (H)  0 - 200 mg/dL Final    Triglycerides 2025 184 (H)  0 - 150 mg/dL Final    HDL Cholesterol 2025 40  40 - 60 mg/dL Final    LDL Cholesterol  2025 160 (H)  0 - 100 mg/dL Final    VLDL Cholesterol 2025 34  5 - 40 mg/dL Final    LDL/HDL Ratio 2025 3.93   Final    Hemoglobin A1C 2025 5.70 (H)  4.80 - 5.60 % Final    TSH 2025 2.770  0.270 - 4.200 uIU/mL Final    Microalbumin/Creatinine Ratio 2025    Final    Unable to calculate    Creatinine, Urine 2025 97.7  mg/dL Final    Microalbumin, Urine 2025 <1.2  mg/dL Final         Physical Exam  Vitals and nursing note reviewed.   Constitutional:       Appearance: Normal appearance. She is normal weight.   HENT:      Head: Normocephalic and atraumatic.   Cardiovascular:      Rate and Rhythm:  Normal rate and regular rhythm.      Pulses: Normal pulses.      Heart sounds: Normal heart sounds. No murmur heard.     No friction rub. No gallop.   Pulmonary:      Effort: Pulmonary effort is normal. No respiratory distress.      Breath sounds: Normal breath sounds. No stridor. No wheezing, rhonchi or rales.   Chest:      Chest wall: No tenderness.   Abdominal:      General: Abdomen is flat. Bowel sounds are normal. There is no distension.      Palpations: Abdomen is soft. There is no mass.      Tenderness: There is no abdominal tenderness. There is no right CVA tenderness, left CVA tenderness, guarding or rebound.      Hernia: No hernia is present.   Feet:      Right foot:      Protective Sensation: 10 sites tested.  5 sites sensed.      Left foot:      Protective Sensation: 10 sites tested.  5 sites sensed.      Comments: No sensation in bilateral heels with diabetic foot exam.  Skin:     General: Skin is warm and dry.      Capillary Refill: Capillary refill takes less than 2 seconds.      Coloration: Skin is not jaundiced or pale.   Neurological:      General: No focal deficit present.      Mental Status: She is alert and oriented to person, place, and time. Mental status is at baseline.      Motor: No weakness.      Coordination: Coordination normal.      Gait: Gait normal.   Psychiatric:         Mood and Affect: Mood normal.         Behavior: Behavior normal.         Thought Content: Thought content normal.         Judgment: Judgment normal.          Physical Exam  Neurological: Decreased sensation in the right heel and parts of the left foot, consistent with peripheral neuropathy.       Result Review  Data Reviewed:{ Labs  Result Review  Imaging  Med Tab  Media :23}   I have reviewed this patient's chart.  I have reviewed previous labs, previous imaging, previous medications, and previous encounters with notes that were available in this patient's chart.    Results  Labs   - A1c: 04/2025, 5.7   - LDL  cholesterol: 04/2025, 160   - Total cholesterol: 04/2025, 234   - Thyroid levels: 04/2025, Normal              Assessment and Plan {CC Problem List  Visit Diagnosis  ROS  Review (Popup)  [facility]  BestPractice  Medications  SmartSets  SnapShot Encounters  Media :23}   Diagnoses and all orders for this visit:    1. Mixed hyperlipidemia (Primary)  -     ezetimibe (Zetia) 10 MG tablet; Take 1 tablet by mouth Daily.  Dispense: 90 tablet; Refill: 1    2. Benign essential HTN    3. Controlled type 2 diabetes mellitus with hyperglycemia, without long-term current use of insulin    4. Pulmonary emphysema, unspecified emphysema type    5. Encounter for diabetic foot exam    6. Peripheral polyneuropathy  -     gabapentin (NEURONTIN) 100 MG capsule; Take 1 capsule by mouth 2 (Two) Times a Day.  Dispense: 60 capsule; Refill: 1        Assessment & Plan  1. Peripheral neuropathy.  - Symptoms of sensation loss and jerking align with a diagnosis of neuropathy.  - Physical exam findings indicate loss of sensation in the heels and certain areas of the feet.  - Reviewed past records and noted previous gabapentin use, discontinued in 06/2021 without a stated reason.  - Prescription for gabapentin 100 mg, to be taken up to twice daily, will be provided. The prescription will include 60 capsules with a refill and will be sent to pharmacy.    2. Type 2 diabetes.  - A1c level is commendably at 5.7, indicating well-managed diabetes.  - Physical exam findings show weight gain, likely due to increased carbohydrate intake.  - Advised to monitor carbohydrate consumption closely and ensure adequate protein and water intake. Discussed the possibility of weight loss supplements and checking insurance coverage.  - Prescription Drug Monitoring Program was reviewed.    3. Hyperlipidemia.  - Cholesterol levels are elevated, with an LDL of 160 and a total cholesterol of 234.  - Physical exam findings indicate adherence to  fenofibrate.  - Reviewed cardiac history and discussed the increased risk of heart attack and stroke due to elevated cholesterol.  - Prescription for Zetia will be provided to help lower cholesterol levels in conjunction with fenofibrate. The prescription will be sent to pharmacy.    4. Hypertension.  - Currently under the care of a cardiologist and taking carvedilol (Coreg) and spironolactone.  - Physical exam findings indicate stable management of hypertension.  - Reviewed current medications and confirmed adherence.  - No changes to current hypertension management plan.       -  -ER red flags discussed with patient including risk versus benefit and education provided.  -Follow-up with me in 3 months or sooner if needed.    I spent 40 minutes caring for Raya on this date of service. This time includes time spent by me in the following activities:preparing for the visit, reviewing tests, obtaining and/or reviewing a separately obtained history, performing a medically appropriate examination and/or evaluation , counseling and educating the patient/family/caregiver, ordering medications, tests, or procedures, referring and communicating with other health care professionals , documenting information in the medical record, independently interpreting results and communicating that information with the patient/family/caregiver, and care coordination.    Follow Up {Instructions Charge Capture  Follow-up Communications :23}     Patient was given instructions and counseling regarding her condition or for health maintenance advice. Please see specific information pulled into the AVS (placed there by myself) if appropriate.    Return in about 3 months (around 8/13/2025) for routine follow up.    BMI is >= 25 and <30. (Overweight) The following options were offered after discussion;: exercise counseling/recommendations and nutrition counseling/recommendations         DELBERT Katz, KERIP-BC      Patient or  patient representative verbalized consent for the use of Ambient Listening during the visit with  DELBERT Katz for chart documentation. 5/13/2025  12:20 EDT

## 2025-05-24 DIAGNOSIS — E78.2 MIXED HYPERLIPIDEMIA: ICD-10-CM

## 2025-05-27 RX ORDER — FENOFIBRATE 145 MG/1
145 TABLET, FILM COATED ORAL DAILY
Qty: 30 TABLET | Refills: 0 | Status: SHIPPED | OUTPATIENT
Start: 2025-05-27

## 2025-06-07 DIAGNOSIS — J45.909 MODERATE ASTHMA, UNSPECIFIED WHETHER COMPLICATED, UNSPECIFIED WHETHER PERSISTENT: ICD-10-CM

## 2025-06-09 RX ORDER — FLUTICASONE PROPIONATE AND SALMETEROL 100; 50 UG/1; UG/1
POWDER RESPIRATORY (INHALATION) 2 TIMES DAILY
Qty: 60 EACH | Refills: 0 | Status: SHIPPED | OUTPATIENT
Start: 2025-06-09

## 2025-06-23 DIAGNOSIS — E78.2 MIXED HYPERLIPIDEMIA: ICD-10-CM

## 2025-06-23 RX ORDER — FENOFIBRATE 145 MG/1
145 TABLET, FILM COATED ORAL DAILY
Qty: 30 TABLET | Refills: 0 | Status: SHIPPED | OUTPATIENT
Start: 2025-06-23

## 2025-06-27 ENCOUNTER — OFFICE (OUTPATIENT)
Dept: URBAN - METROPOLITAN AREA CLINIC 64 | Facility: CLINIC | Age: 50
End: 2025-06-27
Payer: MEDICARE

## 2025-06-27 VITALS
HEIGHT: 63 IN | HEART RATE: 60 BPM | SYSTOLIC BLOOD PRESSURE: 116 MMHG | WEIGHT: 154 LBS | DIASTOLIC BLOOD PRESSURE: 70 MMHG

## 2025-06-27 DIAGNOSIS — R11.2 NAUSEA WITH VOMITING, UNSPECIFIED: ICD-10-CM

## 2025-06-27 DIAGNOSIS — K75.81 NONALCOHOLIC STEATOHEPATITIS (NASH): ICD-10-CM

## 2025-06-27 DIAGNOSIS — K59.00 CONSTIPATION, UNSPECIFIED: ICD-10-CM

## 2025-06-27 DIAGNOSIS — K21.9 GASTRO-ESOPHAGEAL REFLUX DISEASE WITHOUT ESOPHAGITIS: ICD-10-CM

## 2025-06-27 PROCEDURE — 99213 OFFICE O/P EST LOW 20 MIN: CPT | Performed by: NURSE PRACTITIONER

## 2025-07-06 DIAGNOSIS — G62.9 PERIPHERAL POLYNEUROPATHY: ICD-10-CM

## 2025-07-06 DIAGNOSIS — J45.909 MODERATE ASTHMA, UNSPECIFIED WHETHER COMPLICATED, UNSPECIFIED WHETHER PERSISTENT: ICD-10-CM

## 2025-07-07 RX ORDER — GABAPENTIN 100 MG/1
100 CAPSULE ORAL 2 TIMES DAILY
Qty: 60 CAPSULE | Refills: 0 | Status: SHIPPED | OUTPATIENT
Start: 2025-07-12

## 2025-07-07 RX ORDER — FLUTICASONE PROPIONATE AND SALMETEROL 100; 50 UG/1; UG/1
POWDER RESPIRATORY (INHALATION) 2 TIMES DAILY
Qty: 60 EACH | Refills: 2 | Status: SHIPPED | OUTPATIENT
Start: 2025-07-07

## 2025-07-07 NOTE — TELEPHONE ENCOUNTER
Rx Refill Note  Requested Prescriptions     Pending Prescriptions Disp Refills    Fluticasone-Salmeterol (ADVAIR/WIXELA) 100-50 MCG/ACT DISKUS [Pharmacy Med Name: Fluticasone-Salmeterol 100-50 MCG/DOSE Inhalation Aerosol Powder Breath Activated] 60 each 0     Sig: INHALE 1 DOSE BY MOUTH TWICE DAILY    gabapentin (NEURONTIN) 100 MG capsule [Pharmacy Med Name: Gabapentin 100 MG Oral Capsule] 60 capsule 0     Sig: Take 1 capsule by mouth twice daily      Last office visit with prescribing clinician: 01/21/2025  Last telemedicine visit with prescribing clinician: Visit date not found   Next office visit with prescribing clinician: 08/26/2025      UDS 05/27/2025  CSA Not on file    INSPECT - SCAN - Inspect/[facility]/ 07/07/2025 (07/07/2025)

## 2025-07-14 ENCOUNTER — TELEPHONE (OUTPATIENT)
Dept: FAMILY MEDICINE CLINIC | Facility: CLINIC | Age: 50
End: 2025-07-14

## 2025-07-14 ENCOUNTER — OFFICE VISIT (OUTPATIENT)
Dept: FAMILY MEDICINE CLINIC | Facility: CLINIC | Age: 50
End: 2025-07-14
Payer: MEDICARE

## 2025-07-14 VITALS
OXYGEN SATURATION: 98 % | TEMPERATURE: 99.1 F | HEART RATE: 72 BPM | WEIGHT: 150 LBS | RESPIRATION RATE: 19 BRPM | BODY MASS INDEX: 26.58 KG/M2 | HEIGHT: 63 IN

## 2025-07-14 DIAGNOSIS — J98.8 CONGESTION OF UPPER AIRWAY: ICD-10-CM

## 2025-07-14 DIAGNOSIS — E11.43 TYPE 2 DIABETES MELLITUS WITH DIABETIC AUTONOMIC NEUROPATHY, WITHOUT LONG-TERM CURRENT USE OF INSULIN: ICD-10-CM

## 2025-07-14 DIAGNOSIS — J02.9 SORE THROAT: Primary | ICD-10-CM

## 2025-07-14 DIAGNOSIS — R05.1 ACUTE COUGH: ICD-10-CM

## 2025-07-14 LAB
B PARAPERT DNA SPEC QL NAA+PROBE: NOT DETECTED
B PERT DNA SPEC QL NAA+PROBE: NOT DETECTED
C PNEUM DNA NPH QL NAA+NON-PROBE: NOT DETECTED
EXPIRATION DATE: NORMAL
EXPIRATION DATE: NORMAL
FLUAV AG UPPER RESP QL IA.RAPID: NOT DETECTED
FLUAV SUBTYP SPEC NAA+PROBE: NOT DETECTED
FLUBV AG UPPER RESP QL IA.RAPID: NOT DETECTED
FLUBV RNA NPH QL NAA+NON-PROBE: NOT DETECTED
HADV DNA SPEC NAA+PROBE: NOT DETECTED
HCOV 229E RNA SPEC QL NAA+PROBE: NOT DETECTED
HCOV HKU1 RNA SPEC QL NAA+PROBE: NOT DETECTED
HCOV NL63 RNA SPEC QL NAA+PROBE: NOT DETECTED
HCOV OC43 RNA SPEC QL NAA+PROBE: NOT DETECTED
HMPV RNA NPH QL NAA+NON-PROBE: NOT DETECTED
HPIV1 RNA ISLT QL NAA+PROBE: NOT DETECTED
HPIV2 RNA SPEC QL NAA+PROBE: NOT DETECTED
HPIV3 RNA NPH QL NAA+PROBE: NOT DETECTED
HPIV4 P GENE NPH QL NAA+PROBE: NOT DETECTED
INTERNAL CONTROL: NORMAL
INTERNAL CONTROL: NORMAL
Lab: NORMAL
Lab: NORMAL
M PNEUMO IGG SER IA-ACNC: NOT DETECTED
RHINOVIRUS RNA SPEC NAA+PROBE: NOT DETECTED
RSV RNA NPH QL NAA+NON-PROBE: NOT DETECTED
S PYO RRNA THROAT QL PROBE: NEGATIVE
SARS-COV-2 AG UPPER RESP QL IA.RAPID: NOT DETECTED
SARS-COV-2 RNA RESP QL NAA+PROBE: NOT DETECTED

## 2025-07-14 PROCEDURE — 1126F AMNT PAIN NOTED NONE PRSNT: CPT | Performed by: REGISTERED NURSE

## 2025-07-14 PROCEDURE — 0202U NFCT DS 22 TRGT SARS-COV-2: CPT | Performed by: REGISTERED NURSE

## 2025-07-14 PROCEDURE — 1159F MED LIST DOCD IN RCRD: CPT | Performed by: REGISTERED NURSE

## 2025-07-14 PROCEDURE — 87428 SARSCOV & INF VIR A&B AG IA: CPT | Performed by: REGISTERED NURSE

## 2025-07-14 PROCEDURE — 99214 OFFICE O/P EST MOD 30 MIN: CPT | Performed by: REGISTERED NURSE

## 2025-07-14 PROCEDURE — 3044F HG A1C LEVEL LT 7.0%: CPT | Performed by: REGISTERED NURSE

## 2025-07-14 PROCEDURE — 87081 CULTURE SCREEN ONLY: CPT | Performed by: REGISTERED NURSE

## 2025-07-14 PROCEDURE — 87651 STREP A DNA AMP PROBE: CPT | Performed by: REGISTERED NURSE

## 2025-07-14 PROCEDURE — 1160F RVW MEDS BY RX/DR IN RCRD: CPT | Performed by: REGISTERED NURSE

## 2025-07-14 NOTE — PROGRESS NOTES
Chief Complaint  Sore Throat (7 days no fever, congestion, nausea, vomiting, diarrhea)    Subjective    History of Present Illness {CC  Problem List  Visit  Diagnosis   Encounters  Notes  Medications  Labs  Result Review Imaging  Media :23}     Raya Dejesus presents to [facility] for Sore Throat (7 days no fever, congestion, nausea, vomiting, diarrhea).      History of Present Illness  Patient is a 50 y.o. female who presents to the clinic today for sore throat x 1 week.  Patient denies any chest pain, shortness of breath, or any fevers.  Patient denies any known exposure to COVID, flu, or any other contagious illnesses.       History of Present Illness  Regards to sore throat, she has been experiencing a sore throat for approximately a week, with no associated vomiting or nausea. There is mild ear discomfort and pain upon touching her neck. Swallowing food or drink is painful. She has attempted to alleviate her symptoms with allergy medication, but the sore throat persists. She has no history of strep throat.         Review of Systems   Constitutional: Negative.  Negative for activity change, chills, fatigue and fever.   HENT:  Positive for sore throat and trouble swallowing. Negative for congestion, dental problem, ear pain, hearing loss, rhinorrhea, sinus pain and tinnitus.    Eyes: Negative.  Negative for pain and visual disturbance.   Respiratory: Negative.  Negative for cough, chest tightness, shortness of breath and wheezing.    Cardiovascular: Negative.  Negative for chest pain, palpitations and leg swelling.   Gastrointestinal:  Positive for diarrhea and nausea. Negative for abdominal pain and vomiting.   Endocrine: Negative.  Negative for polydipsia, polyphagia and polyuria.   Genitourinary: Negative.  Negative for difficulty urinating, dysuria, frequency and urgency.   Musculoskeletal: Negative.  Negative for arthralgias, back pain and myalgias.   Skin:  "Negative.  Negative for color change, pallor, rash and wound.   Allergic/Immunologic: Negative.  Negative for environmental allergies.   Neurological: Negative.  Negative for dizziness, speech difficulty, weakness, light-headedness, numbness and headaches.   Hematological: Negative.    Psychiatric/Behavioral: Negative.  Negative for confusion, decreased concentration, self-injury and suicidal ideas. The patient is not nervous/anxious.    All other systems reviewed and are negative.       Objective     Vital Signs:   Pulse 72   Temp 99.1 °F (37.3 °C) (Temporal)   Resp 19   Ht 160 cm (63\")   Wt 68 kg (150 lb)   SpO2 98%   BMI 26.57 kg/m²   Current Outpatient Medications on File Prior to Visit   Medication Sig Dispense Refill    aspirin 81 MG chewable tablet Chew 1 tablet Daily.      carvedilol (COREG) 25 MG tablet Take 1 tablet by mouth 2 (Two) Times a Day With Meals.      Entresto 24-26 MG tablet Take 1 tablet by mouth 2 (Two) Times a Day.      ezetimibe (Zetia) 10 MG tablet Take 1 tablet by mouth Daily. 90 tablet 1    famotidine (PEPCID) 40 MG tablet Take 1 tablet by mouth every night at bedtime.      fenofibrate (TRICOR) 145 MG tablet Take 1 tablet by mouth once daily 30 tablet 0    Fluticasone-Salmeterol (ADVAIR/WIXELA) 100-50 MCG/ACT DISKUS INHALE 1 DOSE BY MOUTH TWICE DAILY 60 each 2    furosemide (LASIX) 20 MG tablet Take 1 tablet by mouth As Needed.      gabapentin (NEURONTIN) 100 MG capsule Take 1 capsule by mouth 2 (Two) Times a Day. 60 capsule 0    Linzess 72 MCG capsule capsule Take 1 capsule by mouth Every Morning Before Breakfast.      meclizine (ANTIVERT) 25 MG tablet Take 1 tablet by mouth 3 (Three) Times a Day As Needed for Dizziness. 30 tablet 1    multivitamin with minerals tablet tablet Take 1 tablet by mouth Daily.      ondansetron ODT (ZOFRAN-ODT) 4 MG disintegrating tablet Place 1 tablet on the tongue Every 8 (Eight) Hours As Needed for Nausea or Vomiting. 30 tablet 1    pantoprazole " (PROTONIX) 40 MG EC tablet Take 1 tablet by mouth Daily.      spironolactone (ALDACTONE) 25 MG tablet Take 1 tablet by mouth Daily.      Ventolin  (90 Base) MCG/ACT inhaler INHALE 2 PUFFS BY MOUTH EVERY 4 HOURS AS NEEDED FOR WHEEZING OR SHORTNESS OF BREATH 36 g 0     No current facility-administered medications on file prior to visit.        Past Medical History:   Diagnosis Date    Allergic Several since i was young    Have several allergies to medicine start from a young age    Arthritis 2008    Asthma 2018    Copd since 2018    Cardiomyopathy     CHF (congestive heart failure) 2018    Several heart condition    COPD (chronic obstructive pulmonary disease)     Coronary artery disease 2017    Diabetes mellitus 2019    GERD (gastroesophageal reflux disease)     Heart disease     History of medical problems     Hyperlipidemia     Hypertension     ICD (implantable cardioverter-defibrillator) battery depletion     Injury of back Sept 2021    Lower back pain like a pinch nerve    LBBB (left bundle branch block)     Low back pain     Lower back pain that is always their    MVP (mitral valve prolapse)     KONG (nonalcoholic steatohepatitis)     Neuropathy     Shortness of breath 2018    Copd    UTI (urinary tract infection) 07/26/2022      Past Surgical History:   Procedure Laterality Date    CARDIAC CATHETERIZATION      CHOLECYSTECTOMY  2008    COLON SURGERY  2008    6 inches of the bowels removes    COLONOSCOPY  2022    Negative    ENDOMETRIAL ABLATION      ENDOSCOPY  11/2022    Negative    TONSILLECTOMY  1980    TUBAL ABDOMINAL LIGATION        Family History   Problem Relation Age of Onset    Ovarian cancer Mother     Breast cancer Mother     Cancer Mother     Heart disease Father     Alcohol abuse Father     Arthritis Father     COPD Father     Diabetes Father     Hyperlipidemia Father     Ovarian cancer Maternal Grandmother     Heart disease Maternal Grandmother     Diabetes Maternal Grandmother      Hypertension Maternal Grandmother     Ovarian cancer Paternal Grandmother     Heart disease Paternal Grandmother     Thyroid disease Paternal Grandmother     Diabetes Paternal Grandmother     Stroke Paternal Grandmother     Breast cancer Maternal Aunt     Breast cancer Paternal Aunt       Social History     Socioeconomic History    Marital status:    Tobacco Use    Smoking status: Former     Current packs/day: 0.00     Average packs/day: 2.0 packs/day for 19.5 years (39.0 ttl pk-yrs)     Types: Cigarettes     Start date: 1/1/1991     Quit date: 7/4/2010     Years since quitting: 15.0     Passive exposure: Past    Smokeless tobacco: Never   Vaping Use    Vaping status: Never Used   Substance and Sexual Activity    Alcohol use: Not Currently    Drug use: Never    Sexual activity: Not Currently     Partners: Male     Birth control/protection: None         Office Visit on 07/14/2025   Component Date Value Ref Range Status    SARS Antigen 07/14/2025 Not Detected  Not Detected, Presumptive Negative Final    Influenza A Antigen REGAN 07/14/2025 Not Detected  Not Detected Final    Influenza B Antigen REGAN 07/14/2025 Not Detected  Not Detected Final    Internal Control 07/14/2025 Passed  Passed Final    Lot Number 07/14/2025 [id number]   Final    Expiration Date 07/14/2025 [id number]   Final    POC Strep A, Molecular 07/14/2025 Negative  Negative Final    Internal Control 07/14/2025 Passed  Passed Final    Lot Number 07/14/2025 [id number]   Final    Expiration Date 07/14/2025 [id number]   Final    Throat Culture, Beta Strep 07/14/2025 No Beta Hemolytic Streptococcus Isolated at 2 days   Final    ADENOVIRUS, PCR 07/14/2025 Not Detected  Not Detected Final    Coronavirus 229E 07/14/2025 Not Detected  Not Detected Final    Coronavirus HKU1 07/14/2025 Not Detected  Not Detected Final    Coronavirus NL63 07/14/2025 Not Detected  Not Detected Final    Coronavirus OC43 07/14/2025 Not Detected  Not Detected Final    COVID19  07/14/2025 Not Detected  Not Detected - Ref. Range Final    Human Metapneumovirus 07/14/2025 Not Detected  Not Detected Final    Human Rhinovirus/Enterovirus 07/14/2025 Not Detected  Not Detected Final    Influenza A PCR 07/14/2025 Not Detected  Not Detected Final    Influenza B PCR 07/14/2025 Not Detected  Not Detected Final    Parainfluenza Virus 1 07/14/2025 Not Detected  Not Detected Final    Parainfluenza Virus 2 07/14/2025 Not Detected  Not Detected Final    Parainfluenza Virus 3 07/14/2025 Not Detected  Not Detected Final    Parainfluenza Virus 4 07/14/2025 Not Detected  Not Detected Final    RSV, PCR 07/14/2025 Not Detected  Not Detected Final    Bordetella pertussis pcr 07/14/2025 Not Detected  Not Detected Final    Bordetella parapertussis PCR 07/14/2025 Not Detected  Not Detected Final    Chlamydophila pneumoniae PCR 07/14/2025 Not Detected  Not Detected Final    Mycoplasma pneumo by PCR 07/14/2025 Not Detected  Not Detected Final   Clinical Support on 04/30/2025   Component Date Value Ref Range Status    Total Cholesterol 04/30/2025 234 (H)  0 - 200 mg/dL Final    Triglycerides 04/30/2025 184 (H)  0 - 150 mg/dL Final    HDL Cholesterol 04/30/2025 40  40 - 60 mg/dL Final    LDL Cholesterol  04/30/2025 160 (H)  0 - 100 mg/dL Final    VLDL Cholesterol 04/30/2025 34  5 - 40 mg/dL Final    LDL/HDL Ratio 04/30/2025 3.93   Final    Hemoglobin A1C 04/30/2025 5.70 (H)  4.80 - 5.60 % Final    TSH 04/30/2025 2.770  0.270 - 4.200 uIU/mL Final    Microalbumin/Creatinine Ratio 04/30/2025    Final    Unable to calculate    Creatinine, Urine 04/30/2025 97.7  mg/dL Final    Microalbumin, Urine 04/30/2025 <1.2  mg/dL Final         Physical Exam  Vitals and nursing note reviewed.   Constitutional:       Appearance: Normal appearance. She is normal weight.   HENT:      Head: Normocephalic and atraumatic.   Cardiovascular:      Rate and Rhythm: Normal rate and regular rhythm.      Pulses: Normal pulses.      Heart sounds:  Normal heart sounds. No murmur heard.     No friction rub. No gallop.   Pulmonary:      Effort: Pulmonary effort is normal. No respiratory distress.      Breath sounds: Normal breath sounds. No stridor. No wheezing, rhonchi or rales.   Chest:      Chest wall: No tenderness.   Abdominal:      General: Abdomen is flat. Bowel sounds are normal. There is no distension.      Palpations: Abdomen is soft. There is no mass.      Tenderness: There is no abdominal tenderness. There is no right CVA tenderness, left CVA tenderness, guarding or rebound.      Hernia: No hernia is present.   Skin:     General: Skin is warm and dry.      Capillary Refill: Capillary refill takes less than 2 seconds.      Coloration: Skin is not jaundiced or pale.   Neurological:      General: No focal deficit present.      Mental Status: She is alert and oriented to person, place, and time. Mental status is at baseline.      Motor: No weakness.      Coordination: Coordination normal.      Gait: Gait normal.   Psychiatric:         Mood and Affect: Mood normal.         Behavior: Behavior normal.         Thought Content: Thought content normal.         Judgment: Judgment normal.          Physical Exam  Throat was examined. No white spots noted.       Result Review  Data Reviewed:{ Labs  Result Review  Imaging  Med Tab  Media :23}   I have reviewed this patient's chart.  I have reviewed previous labs, previous imaging, previous medications, and previous encounters with notes that were available in this patient's chart.    Results                Assessment and Plan {CC Problem List  Visit Diagnosis  ROS  Review (Popup)  [facility]  Quality  BestPractice  Medications  SmartSets  SnapShot Encounters  Media :23}   Diagnoses and all orders for this visit:    1. Sore throat (Primary)  -     POCT SARS-CoV-2 + Flu Antigen REGAN  -     POCT Strep A, molecular  -     Beta Strep Culture, Throat - , Throat; Future  -     Respiratory Panel PCR  w/COVID-19(SARS-CoV-2) LENNY/JAIRON/DENNIS/PAD/COR/EDGARD In-House, NP Swab in UTM/VTM, 2 HR TAT - Swab, Nasopharynx; Future  -     Beta Strep Culture, Throat - Swab, Throat  -     Respiratory Panel PCR w/COVID-19(SARS-CoV-2) LENNY/JAIRON/DENNIS/PAD/COR/EDGARD In-House, NP Swab in UTM/VTM, 2 HR TAT - Swab, Nasopharynx    2. Acute cough  -     POCT SARS-CoV-2 + Flu Antigen REGAN  -     Respiratory Panel PCR w/COVID-19(SARS-CoV-2) LENNY/JAIRON/DENNIS/PAD/COR/EDGARD In-House, NP Swab in UTM/VTM, 2 HR TAT - Swab, Nasopharynx; Future  -     Respiratory Panel PCR w/COVID-19(SARS-CoV-2) LENNY/JAIRON/DENNIS/PAD/COR/EDGARD In-House, NP Swab in UTM/VTM, 2 HR TAT - Swab, Nasopharynx    3. Congestion of upper airway  -     Respiratory Panel PCR w/COVID-19(SARS-CoV-2) LENNY/JAIRON/DENNIS/PAD/COR/EDGARD In-House, NP Swab in UTM/VTM, 2 HR TAT - Swab, Nasopharynx; Future  -     Respiratory Panel PCR w/COVID-19(SARS-CoV-2) LENNY/JAIRON/DENNIS/PAD/COR/EDGARD In-House, NP Swab in UTM/VTM, 2 HR TAT - Swab, Nasopharynx    4. Type 2 diabetes mellitus with diabetic autonomic neuropathy, without long-term current use of insulin  -     Respiratory Panel PCR w/COVID-19(SARS-CoV-2) LENNY/JAIRON/DENNIS/PAD/COR/EDGARD In-House, NP Swab in UTM/VTM, 2 HR TAT - Swab, Nasopharynx; Future  -     Respiratory Panel PCR w/COVID-19(SARS-CoV-2) LENNY/JAIRON/DENNIS/PAD/COR/EDGARD In-House, NP Swab in UTM/VTM, 2 HR TAT - Swab, Nasopharynx        Assessment & Plan  1. Sore throat.  The symptoms do not appear to be indicative of strep throat. A throat culture will be conducted to identify any potential strains of strep. Additionally, a respiratory panel will be performed to test for 14 common pathogens, both viral and bacterial. The results are expected within 24 to 72 hours and will be communicated via [employer]. In the meantime, she is advised to take Zyrtec, Claritin, or Allegra to manage the inflammatory response. If tolerated, a small dose of Benadryl can be taken to further reduce inflammation. Mucinex is recommended to help thin out mucus  and facilitate drainage. She should avoid contact with elderly individuals and infants during this period. If the results are positive, appropriate treatment will be initiated.       -  -ER red flags discussed with patient including risk versus benefit and education provided.  -Follow-up with me in 3 days if not improving.  If improving follow-up at next routine 3-month visit.    I spent 30 minutes caring for Raya on this date of service. This time includes time spent by me in the following activities:preparing for the visit, reviewing tests, obtaining and/or reviewing a separately obtained history, performing a medically appropriate examination and/or evaluation , counseling and educating the patient/family/caregiver, ordering medications, tests, or procedures, referring and communicating with other health care professionals , documenting information in the medical record, independently interpreting results and communicating that information with the patient/family/caregiver, and care coordination.    Follow Up {Instructions Charge Capture  Follow-up Communications :23}     Patient was given instructions and counseling regarding her condition or for health maintenance advice. Please see specific information pulled into the AVS (placed there by myself) if appropriate.    Return in about 3 days (around 7/17/2025), or if symptoms worsen or fail to improve, for Recheck.    BMI is >= 25 and <30. (Overweight) The following options were offered after discussion;: exercise counseling/recommendations and nutrition counseling/recommendations         DELBERT Katz, [facility]      Patient or patient representative verbalized consent for the use of Ambient Listening during the visit with  DELBERT Katz for chart documentation. 7/20/2025  16:26 EDT

## 2025-07-14 NOTE — TELEPHONE ENCOUNTER
Hub staff attempted to follow warm transfer process and was unsuccessful     Caller: Raya Dejesus    Relationship to patient: Self    Best call back number: [phone]    Patient is needing: PATIENT IS CALLING TO STATE SHE IS HERE IN RED TRUCK FOR SWAB.    UNABLE TO WARM TRANSFER.  PLEASE ADVISE.

## 2025-07-16 LAB — BACTERIA SPEC AEROBE CULT: NORMAL

## 2025-07-20 DIAGNOSIS — E78.2 MIXED HYPERLIPIDEMIA: ICD-10-CM

## 2025-07-21 ENCOUNTER — OFFICE VISIT (OUTPATIENT)
Dept: FAMILY MEDICINE CLINIC | Facility: CLINIC | Age: 50
End: 2025-07-21
Payer: MEDICARE

## 2025-07-21 VITALS
HEIGHT: 63 IN | DIASTOLIC BLOOD PRESSURE: 78 MMHG | RESPIRATION RATE: 16 BRPM | HEART RATE: 61 BPM | SYSTOLIC BLOOD PRESSURE: 144 MMHG | OXYGEN SATURATION: 98 % | WEIGHT: 152.8 LBS | TEMPERATURE: 99 F | BODY MASS INDEX: 27.07 KG/M2

## 2025-07-21 DIAGNOSIS — R35.0 URINE FREQUENCY: Primary | ICD-10-CM

## 2025-07-21 DIAGNOSIS — R07.0 THROAT PAIN: ICD-10-CM

## 2025-07-21 DIAGNOSIS — R11.2 NAUSEA AND VOMITING, UNSPECIFIED VOMITING TYPE: ICD-10-CM

## 2025-07-21 LAB
BASOPHILS # BLD AUTO: 0.01 10*3/MM3 (ref 0–0.2)
BASOPHILS NFR BLD AUTO: 0.1 % (ref 0–1.5)
BILIRUB BLD-MCNC: NEGATIVE MG/DL
CLARITY, POC: ABNORMAL
COLOR UR: YELLOW
DEPRECATED RDW RBC AUTO: 42.3 FL (ref 37–54)
EOSINOPHIL # BLD AUTO: 0 10*3/MM3 (ref 0–0.4)
EOSINOPHIL NFR BLD AUTO: 0 % (ref 0.3–6.2)
ERYTHROCYTE [DISTWIDTH] IN BLOOD BY AUTOMATED COUNT: 13 % (ref 12.3–15.4)
EXPIRATION DATE: ABNORMAL
GLUCOSE UR STRIP-MCNC: NEGATIVE MG/DL
HCT VFR BLD AUTO: 41.7 % (ref 34–46.6)
HGB BLD-MCNC: 13.5 G/DL (ref 12–15.9)
IMM GRANULOCYTES # BLD AUTO: 0.02 10*3/MM3 (ref 0–0.05)
IMM GRANULOCYTES NFR BLD AUTO: 0.3 % (ref 0–0.5)
KETONES UR QL: NEGATIVE
LEUKOCYTE EST, POC: NEGATIVE
LYMPHOCYTES # BLD AUTO: 1.94 10*3/MM3 (ref 0.7–3.1)
LYMPHOCYTES NFR BLD AUTO: 25.8 % (ref 19.6–45.3)
Lab: ABNORMAL
MCH RBC QN AUTO: 29.3 PG (ref 26.6–33)
MCHC RBC AUTO-ENTMCNC: 32.4 G/DL (ref 31.5–35.7)
MCV RBC AUTO: 90.7 FL (ref 79–97)
MONOCYTES # BLD AUTO: 0.67 10*3/MM3 (ref 0.1–0.9)
MONOCYTES NFR BLD AUTO: 8.9 % (ref 5–12)
NEUTROPHILS NFR BLD AUTO: 4.88 10*3/MM3 (ref 1.7–7)
NEUTROPHILS NFR BLD AUTO: 64.9 % (ref 42.7–76)
NITRITE UR-MCNC: NEGATIVE MG/ML
NRBC BLD AUTO-RTO: 0 /100 WBC (ref 0–0.2)
PH UR: 6 [PH] (ref 5–8)
PLATELET # BLD AUTO: 330 10*3/MM3 (ref 140–450)
PMV BLD AUTO: 10.2 FL (ref 6–12)
PROT UR STRIP-MCNC: NEGATIVE MG/DL
RBC # BLD AUTO: 4.6 10*6/MM3 (ref 3.77–5.28)
RBC # UR STRIP: NEGATIVE /UL
SP GR UR: 1.02 (ref 1–1.03)
UROBILINOGEN UR QL: ABNORMAL
WBC NRBC COR # BLD AUTO: 7.52 10*3/MM3 (ref 3.4–10.8)

## 2025-07-21 PROCEDURE — 85025 COMPLETE CBC W/AUTO DIFF WBC: CPT

## 2025-07-21 PROCEDURE — 80053 COMPREHEN METABOLIC PANEL: CPT

## 2025-07-21 RX ORDER — ONDANSETRON 8 MG/1
8 TABLET, FILM COATED ORAL EVERY 12 HOURS PRN
Qty: 10 TABLET | Refills: 0 | Status: SHIPPED | OUTPATIENT
Start: 2025-07-21

## 2025-07-21 RX ORDER — PHENAZOPYRIDINE HYDROCHLORIDE 200 MG/1
200 TABLET, FILM COATED ORAL
COMMUNITY
Start: 2025-06-11

## 2025-07-21 RX ORDER — FENOFIBRATE 145 MG/1
145 TABLET, FILM COATED ORAL DAILY
Qty: 30 TABLET | Refills: 0 | Status: SHIPPED | OUTPATIENT
Start: 2025-07-21

## 2025-07-21 NOTE — PROGRESS NOTES
Venipuncture Blood Specimen Collection  Venipuncture performed in RT ARM by Rachel Ray with good hemostasis. Patient tolerated the procedure well without complications.   07/21/25   Rachel Ray

## 2025-07-21 NOTE — PROGRESS NOTES
Chief Complaint  Sore Throat (Pt has had a sore throat for 2+ weeks), Vomiting (Pt has had nausea and vomiting 5 days), Earache (Rt ear pain for 2+ weeks. Pt has also had some dizziness for 5-6 days.), and Urinary Tract Infection (Pt states that when she vomits she has bladder leakage. She is wondering if she may have a UTI)    Subjective    History of Present Illness {CC  Problem List  Visit  Diagnosis   Encounters  Notes  Medications  Labs  Result Review Imaging  Media :23}     Raya Dejesus presents to [facility] for Sore Throat (Pt has had a sore throat for 2+ weeks), Vomiting (Pt has had nausea and vomiting 5 days), Earache (Rt ear pain for 2+ weeks. Pt has also had some dizziness for 5-6 days.), and Urinary Tract Infection (Pt states that when she vomits she has bladder leakage. She is wondering if she may have a UTI).        History of Present Illness  The patient presents with complaints of sore throat and vomiting.    They have been experiencing a sore throat for slightly over 2 weeks. Despite a negative throat culture, negative initial covid/flu, negative respiratory panel - they continue to exerience throat pain, nausea, and vomiting. They describe their throat as irritating and lumpy but have not noticed any white patches. They report no cough or difficulty swallowing. No fever or chills. They have been taking allergy medication but not Mucinex.    They also report nausea, vomiting, and dizziness. Their vomiting is severe enough to cause urinary incontinence. They have tried Zofran 4 mg, which dissolves in their mouth but does not prevent vomiting. Their last episode of vomiting was just before this visit after attempting to eat chicken. They have diarrhea and feel dehydrated. They have not been around anyone else who is ill. No abdominal pain. No back pain.     They suspect a urinary tract infection (UTI) due to the burning sensation during urination, although  "they report no blood in their urine. They have a history of UTIs and are under the care of a urologist.    They initially had discomfort in their right ear, which has since resolved.             Objective     Vital Signs:   /78   Pulse 61   Temp 99 °F (37.2 °C) (Oral)   Resp 16   Ht 160 cm (63\")   Wt 69.3 kg (152 lb 12.8 oz)   SpO2 98%   BMI 27.07 kg/m²   Current Outpatient Medications on File Prior to Visit   Medication Sig Dispense Refill    aspirin 81 MG chewable tablet Chew 1 tablet Daily.      carvedilol (COREG) 25 MG tablet Take 1 tablet by mouth 2 (Two) Times a Day With Meals.      Entresto 24-26 MG tablet Take 1 tablet by mouth 2 (Two) Times a Day.      ezetimibe (Zetia) 10 MG tablet Take 1 tablet by mouth Daily. 90 tablet 1    famotidine (PEPCID) 40 MG tablet Take 1 tablet by mouth every night at bedtime.      fenofibrate (TRICOR) 145 MG tablet Take 1 tablet by mouth once daily 30 tablet 0    Fluticasone-Salmeterol (ADVAIR/WIXELA) 100-50 MCG/ACT DISKUS INHALE 1 DOSE BY MOUTH TWICE DAILY 60 each 2    furosemide (LASIX) 20 MG tablet Take 1 tablet by mouth As Needed.      gabapentin (NEURONTIN) 100 MG capsule Take 1 capsule by mouth 2 (Two) Times a Day. 60 capsule 0    Linzess 72 MCG capsule capsule Take 1 capsule by mouth Every Morning Before Breakfast.      meclizine (ANTIVERT) 25 MG tablet Take 1 tablet by mouth 3 (Three) Times a Day As Needed for Dizziness. 30 tablet 1    multivitamin with minerals tablet tablet Take 1 tablet by mouth Daily.      ondansetron ODT (ZOFRAN-ODT) 4 MG disintegrating tablet Place 1 tablet on the tongue Every 8 (Eight) Hours As Needed for Nausea or Vomiting. 30 tablet 1    pantoprazole (PROTONIX) 40 MG EC tablet Take 1 tablet by mouth Daily.      phenazopyridine (PYRIDIUM) 200 MG tablet Take 1 tablet by mouth.      spironolactone (ALDACTONE) 25 MG tablet Take 1 tablet by mouth Daily.      Ventolin  (90 Base) MCG/ACT inhaler INHALE 2 PUFFS BY MOUTH EVERY 4 " HOURS AS NEEDED FOR WHEEZING OR SHORTNESS OF BREATH 36 g 0    [DISCONTINUED] fenofibrate (TRICOR) 145 MG tablet Take 1 tablet by mouth once daily 30 tablet 0     No current facility-administered medications on file prior to visit.        Past Medical History:   Diagnosis Date    Allergic Several since i was young    Have several allergies to medicine start from a young age    Arthritis 2008    Asthma 2018    Copd since 2018    Cardiomyopathy     CHF (congestive heart failure) 2018    Several heart condition    COPD (chronic obstructive pulmonary disease)     Coronary artery disease 2017    Diabetes mellitus 2019    GERD (gastroesophageal reflux disease)     Heart disease     History of medical problems     Hyperlipidemia     Hypertension     ICD (implantable cardioverter-defibrillator) battery depletion     Injury of back Sept 2021    Lower back pain like a pinch nerve    LBBB (left bundle branch block)     Low back pain     Lower back pain that is always their    MVP (mitral valve prolapse)     KONG (nonalcoholic steatohepatitis)     Neuropathy     Shortness of breath 2018    Copd    UTI (urinary tract infection) 07/26/2022      Past Surgical History:   Procedure Laterality Date    CARDIAC CATHETERIZATION      CHOLECYSTECTOMY  2008    COLON SURGERY  2008    6 inches of the bowels removes    COLONOSCOPY  2022    Negative    ENDOMETRIAL ABLATION      ENDOSCOPY  11/2022    Negative    TONSILLECTOMY  1980    TUBAL ABDOMINAL LIGATION        Family History   Problem Relation Age of Onset    Ovarian cancer Mother     Breast cancer Mother     Cancer Mother     Heart disease Father     Alcohol abuse Father     Arthritis Father     COPD Father     Diabetes Father     Hyperlipidemia Father     Ovarian cancer Maternal Grandmother     Heart disease Maternal Grandmother     Diabetes Maternal Grandmother     Hypertension Maternal Grandmother     Ovarian cancer Paternal Grandmother     Heart disease Paternal Grandmother      Thyroid disease Paternal Grandmother     Diabetes Paternal Grandmother     Stroke Paternal Grandmother     Breast cancer Maternal Aunt     Breast cancer Paternal Aunt       Social History     Socioeconomic History    Marital status:    Tobacco Use    Smoking status: Former     Current packs/day: 0.00     Average packs/day: 2.0 packs/day for 19.5 years (39.0 ttl pk-yrs)     Types: Cigarettes     Start date: 1/1/1991     Quit date: 7/4/2010     Years since quitting: 15.0     Passive exposure: Past    Smokeless tobacco: Never   Vaping Use    Vaping status: Never Used   Substance and Sexual Activity    Alcohol use: Not Currently    Drug use: Never    Sexual activity: Not Currently     Partners: Male     Birth control/protection: None         Office Visit on 07/21/2025   Component Date Value Ref Range Status    Color 07/21/2025 Yellow  Yellow, Straw, Dark Yellow, Marielena Final    Clarity, UA 07/21/2025 Slightly Cloudy (A)  Clear Final    Specific Gravity  07/21/2025 1.025  1.005 - 1.030 Final    pH, Urine 07/21/2025 6.0  5.0 - 8.0 Final    Leukocytes 07/21/2025 Negative  Negative Final    Nitrite, UA 07/21/2025 Negative  Negative Final    Protein, POC 07/21/2025 Negative  Negative mg/dL Final    Glucose, UA 07/21/2025 Negative  Negative mg/dL Final    Ketones, UA 07/21/2025 Negative  Negative Final    Urobilinogen, UA 07/21/2025 0.2 E.U./dL  Normal, 0.2 E.U./dL Final    Bilirubin 07/21/2025 Negative  Negative Final    Blood, UA 07/21/2025 Negative  Negative Final    Lot Number 07/21/2025 [id number]   Final    Expiration Date 07/21/2025 3/31/2025   Final   Office Visit on 07/14/2025   Component Date Value Ref Range Status    SARS Antigen 07/14/2025 Not Detected  Not Detected, Presumptive Negative Final    Influenza A Antigen REGAN 07/14/2025 Not Detected  Not Detected Final    Influenza B Antigen REGAN 07/14/2025 Not Detected  Not Detected Final    Internal Control 07/14/2025 Passed  Passed Final    Lot Number 07/14/2025  [id number]   Final    Expiration Date 07/14/2025 3,052,026   Final    POC Strep A, Molecular 07/14/2025 Negative  Negative Final    Internal Control 07/14/2025 Passed  Passed Final    Lot Number 07/14/2025 [id number]   Final    Expiration Date 07/14/2025 4,[id number]   Final    Throat Culture, Beta Strep 07/14/2025 No Beta Hemolytic Streptococcus Isolated at 2 days   Final    ADENOVIRUS, PCR 07/14/2025 Not Detected  Not Detected Final    Coronavirus 229E 07/14/2025 Not Detected  Not Detected Final    Coronavirus HKU1 07/14/2025 Not Detected  Not Detected Final    Coronavirus NL63 07/14/2025 Not Detected  Not Detected Final    Coronavirus OC43 07/14/2025 Not Detected  Not Detected Final    COVID19 07/14/2025 Not Detected  Not Detected - Ref. Range Final    Human Metapneumovirus 07/14/2025 Not Detected  Not Detected Final    Human Rhinovirus/Enterovirus 07/14/2025 Not Detected  Not Detected Final    Influenza A PCR 07/14/2025 Not Detected  Not Detected Final    Influenza B PCR 07/14/2025 Not Detected  Not Detected Final    Parainfluenza Virus 1 07/14/2025 Not Detected  Not Detected Final    Parainfluenza Virus 2 07/14/2025 Not Detected  Not Detected Final    Parainfluenza Virus 3 07/14/2025 Not Detected  Not Detected Final    Parainfluenza Virus 4 07/14/2025 Not Detected  Not Detected Final    RSV, PCR 07/14/2025 Not Detected  Not Detected Final    Bordetella pertussis pcr 07/14/2025 Not Detected  Not Detected Final    Bordetella parapertussis PCR 07/14/2025 Not Detected  Not Detected Final    Chlamydophila pneumoniae PCR 07/14/2025 Not Detected  Not Detected Final    Mycoplasma pneumo by PCR 07/14/2025 Not Detected  Not Detected Final   Clinical Support on 04/30/2025   Component Date Value Ref Range Status    Total Cholesterol 04/30/2025 234 (H)  0 - 200 mg/dL Final    Triglycerides 04/30/2025 184 (H)  0 - 150 mg/dL Final    HDL Cholesterol 04/30/2025 40  40 - 60 mg/dL Final    LDL Cholesterol  04/30/2025 160 (H)  0 - 100  mg/dL Final    VLDL Cholesterol 04/30/2025 34  5 - 40 mg/dL Final    LDL/HDL Ratio 04/30/2025 3.93   Final    Hemoglobin A1C 04/30/2025 5.70 (H)  4.80 - 5.60 % Final    TSH 04/30/2025 2.770  0.270 - 4.200 uIU/mL Final    Microalbumin/Creatinine Ratio 04/30/2025    Final    Unable to calculate    Creatinine, Urine 04/30/2025 97.7  mg/dL Final    Microalbumin, Urine 04/30/2025 <1.2  mg/dL Final         Physical Exam  Vitals and nursing note reviewed.   Constitutional:       General: She is not in acute distress.     Appearance: Normal appearance. She is not ill-appearing.   HENT:      Head: Normocephalic and atraumatic.      Right Ear: Tympanic membrane and ear canal normal.      Left Ear: Tympanic membrane and ear canal normal.      Nose: Nose normal.      Mouth/Throat:      Mouth: Mucous membranes are moist.      Pharynx: No oropharyngeal exudate or posterior oropharyngeal erythema.   Eyes:      General: No scleral icterus.        Right eye: No discharge.         Left eye: No discharge.      Extraocular Movements: Extraocular movements intact.      Conjunctiva/sclera: Conjunctivae normal.   Cardiovascular:      Rate and Rhythm: Normal rate and regular rhythm.      Pulses: Normal pulses.      Heart sounds: Normal heart sounds. No murmur heard.     No friction rub. No gallop.   Pulmonary:      Effort: Pulmonary effort is normal. No respiratory distress.      Breath sounds: Normal breath sounds. No stridor. No wheezing, rhonchi or rales.   Abdominal:      General: Abdomen is flat. Bowel sounds are normal. There is no distension.      Palpations: Abdomen is soft. There is no mass.      Tenderness: There is no abdominal tenderness. There is no right CVA tenderness, left CVA tenderness, guarding or rebound.      Hernia: No hernia is present.   Musculoskeletal:         General: Normal range of motion.      Cervical back: Normal range of motion and neck supple.   Skin:     General: Skin is warm and dry.      Findings: No  rash.   Neurological:      General: No focal deficit present.      Mental Status: She is alert. Mental status is at baseline.      Motor: No weakness.   Psychiatric:         Mood and Affect: Mood normal.         Behavior: Behavior normal.         Thought Content: Thought content normal.         Judgment: Judgment normal.          Result Review  Data Reviewed:{ Labs  Result Review  Imaging  Med Tab  Media :23}   I have reviewed this patient's chart.  I have reviewed previous labs, previous imaging, previous medications, and previous encounters with notes that were available in this patient's chart.               Assessment and Plan {CC Problem List  Visit Diagnosis  ROS  Review (Popup)  [facility]  Quality  BestPractice  Medications  SmartSets  SnapShot Encounters  Media :23}      Urine frequency    Orders:    POCT urinalysis dipstick, automated    Nausea and vomiting, unspecified vomiting type    Orders:    CBC w AUTO Differential; Future    Comprehensive metabolic panel; Future    ondansetron (ZOFRAN) 8 MG tablet; Take 1 tablet by mouth Every 12 (Twelve) Hours As Needed for Nausea or Vomiting.    Throat pain              Assessment & Plan  1. Sore Throat  - Persistent sore throat for over 2 weeks, with negative strep, negative respiratory panel  - Throat culture and respiratory panel were negative  - Examination revealed no white patches or significant abnormalities  - Continue allergy medication  - I am unsure the cause of this - it seems to be improving and now only worse from vomiting. Patient will follow up in 1 week if still not better. I am getter labs today - CBC, CMP, since we don't know the cause of this and now vomiting is slightly worse with mild temp increase.     2. Vomiting  - Persistent vomiting, exacerbated by eating, and not improved with Zofran 4 mg  - Last episode of vomiting occurred just before the visit  - Increase Zofran dosage to 8 mg twice daily PRN for short-term  only  - CBC and CMP today to look for dehydration, signs of systemic infection  - Monitor for improvement; further evaluation if symptoms persist    4. Suspected Urinary Tract Infection (UTI)  - Burning sensation during urination; history of recurrent UTIs  - Urinalysis results were normal      Follow-up  - Follow-up visit scheduled in 1 week if there is no improvement in their condition        Follow Up {Instructions Charge Capture  Follow-up Communications :23}     Patient was given instructions and counseling regarding her condition or for health maintenance advice. Please see specific information pulled into the AVS (placed there by myself) if appropriate.    Return in about 1 week (around 7/28/2025) for 1 week if not better.      Nel Mendez PA-C      Patient or patient representative verbalized consent for the use of Ambient Listening during the visit with  Nel Mendez PA-C for chart documentation. 7/21/2025  15:00 EDT

## 2025-07-22 LAB
ALBUMIN SERPL-MCNC: 4.5 G/DL (ref 3.5–5.2)
ALBUMIN/GLOB SERPL: 1.6 G/DL
ALP SERPL-CCNC: 52 U/L (ref 39–117)
ALT SERPL W P-5'-P-CCNC: 18 U/L (ref 1–33)
ANION GAP SERPL CALCULATED.3IONS-SCNC: 13.1 MMOL/L (ref 5–15)
AST SERPL-CCNC: 21 U/L (ref 1–32)
BILIRUB SERPL-MCNC: 0.4 MG/DL (ref 0–1.2)
BUN SERPL-MCNC: 12 MG/DL (ref 6–20)
BUN/CREAT SERPL: 11.3 (ref 7–25)
CALCIUM SPEC-SCNC: 10.1 MG/DL (ref 8.6–10.5)
CHLORIDE SERPL-SCNC: 102 MMOL/L (ref 98–107)
CO2 SERPL-SCNC: 22.9 MMOL/L (ref 22–29)
CREAT SERPL-MCNC: 1.06 MG/DL (ref 0.57–1)
EGFRCR SERPLBLD CKD-EPI 2021: 64.5 ML/MIN/1.73
GLOBULIN UR ELPH-MCNC: 2.8 GM/DL
GLUCOSE SERPL-MCNC: 99 MG/DL (ref 65–99)
POTASSIUM SERPL-SCNC: 4.3 MMOL/L (ref 3.5–5.2)
PROT SERPL-MCNC: 7.3 G/DL (ref 6–8.5)
SODIUM SERPL-SCNC: 138 MMOL/L (ref 136–145)

## 2025-08-10 DIAGNOSIS — G62.9 PERIPHERAL POLYNEUROPATHY: ICD-10-CM

## 2025-08-11 RX ORDER — GABAPENTIN 100 MG/1
100 CAPSULE ORAL 2 TIMES DAILY
Qty: 60 CAPSULE | Refills: 0 | Status: SHIPPED | OUTPATIENT
Start: 2025-08-11

## 2025-08-17 DIAGNOSIS — E78.2 MIXED HYPERLIPIDEMIA: ICD-10-CM

## 2025-08-18 RX ORDER — FENOFIBRATE 145 MG/1
145 TABLET, FILM COATED ORAL DAILY
Qty: 30 TABLET | Refills: 0 | Status: SHIPPED | OUTPATIENT
Start: 2025-08-18

## 2025-08-26 ENCOUNTER — OFFICE VISIT (OUTPATIENT)
Dept: FAMILY MEDICINE CLINIC | Facility: CLINIC | Age: 50
End: 2025-08-26
Payer: MEDICARE

## 2025-08-26 VITALS
DIASTOLIC BLOOD PRESSURE: 69 MMHG | HEART RATE: 63 BPM | WEIGHT: 154.8 LBS | HEIGHT: 63 IN | BODY MASS INDEX: 27.43 KG/M2 | TEMPERATURE: 97.1 F | RESPIRATION RATE: 18 BRPM | OXYGEN SATURATION: 98 % | SYSTOLIC BLOOD PRESSURE: 113 MMHG

## 2025-08-26 DIAGNOSIS — G62.9 PERIPHERAL POLYNEUROPATHY: Primary | ICD-10-CM

## 2025-08-26 DIAGNOSIS — E78.2 MIXED HYPERLIPIDEMIA: ICD-10-CM

## 2025-08-26 DIAGNOSIS — J45.909 MODERATE ASTHMA, UNSPECIFIED WHETHER COMPLICATED, UNSPECIFIED WHETHER PERSISTENT: ICD-10-CM

## 2025-08-26 DIAGNOSIS — R73.9 HYPERGLYCEMIA: ICD-10-CM

## 2025-08-26 PROCEDURE — 80061 LIPID PANEL: CPT | Performed by: REGISTERED NURSE

## 2025-08-26 PROCEDURE — 83036 HEMOGLOBIN GLYCOSYLATED A1C: CPT | Performed by: REGISTERED NURSE

## 2025-08-26 PROCEDURE — 80053 COMPREHEN METABOLIC PANEL: CPT | Performed by: REGISTERED NURSE

## 2025-08-26 PROCEDURE — 85025 COMPLETE CBC W/AUTO DIFF WBC: CPT | Performed by: REGISTERED NURSE

## 2025-08-26 RX ORDER — GABAPENTIN 100 MG/1
100 CAPSULE ORAL 3 TIMES DAILY
Qty: 90 CAPSULE | Refills: 0 | Status: SHIPPED | OUTPATIENT
Start: 2025-08-26

## 2025-08-27 LAB
ALBUMIN SERPL-MCNC: 4.4 G/DL (ref 3.5–5.2)
ALBUMIN/GLOB SERPL: 1.6 G/DL
ALP SERPL-CCNC: 46 U/L (ref 39–117)
ALT SERPL W P-5'-P-CCNC: 24 U/L (ref 1–33)
ANION GAP SERPL CALCULATED.3IONS-SCNC: 13.6 MMOL/L (ref 5–15)
AST SERPL-CCNC: 26 U/L (ref 1–32)
BASOPHILS # BLD AUTO: 0 10*3/MM3 (ref 0–0.2)
BASOPHILS NFR BLD AUTO: 0 % (ref 0–1.5)
BILIRUB SERPL-MCNC: 0.5 MG/DL (ref 0–1.2)
BUN SERPL-MCNC: 12 MG/DL (ref 6–20)
BUN/CREAT SERPL: 11.7 (ref 7–25)
CALCIUM SPEC-SCNC: 9.5 MG/DL (ref 8.6–10.5)
CHLORIDE SERPL-SCNC: 103 MMOL/L (ref 98–107)
CHOLEST SERPL-MCNC: 197 MG/DL (ref 0–200)
CO2 SERPL-SCNC: 21.4 MMOL/L (ref 22–29)
CREAT SERPL-MCNC: 1.03 MG/DL (ref 0.57–1)
DEPRECATED RDW RBC AUTO: 43.2 FL (ref 37–54)
EGFRCR SERPLBLD CKD-EPI 2021: 66.8 ML/MIN/1.73
EOSINOPHIL # BLD AUTO: 0 10*3/MM3 (ref 0–0.4)
EOSINOPHIL NFR BLD AUTO: 0 % (ref 0.3–6.2)
ERYTHROCYTE [DISTWIDTH] IN BLOOD BY AUTOMATED COUNT: 13.2 % (ref 12.3–15.4)
GLOBULIN UR ELPH-MCNC: 2.7 GM/DL
GLUCOSE SERPL-MCNC: 118 MG/DL (ref 65–99)
HBA1C MFR BLD: 6.1 % (ref 4.8–5.6)
HCT VFR BLD AUTO: 42.1 % (ref 34–46.6)
HDLC SERPL-MCNC: 43 MG/DL (ref 40–60)
HGB BLD-MCNC: 14.1 G/DL (ref 12–15.9)
IMM GRANULOCYTES # BLD AUTO: 0.01 10*3/MM3 (ref 0–0.05)
IMM GRANULOCYTES NFR BLD AUTO: 0.2 % (ref 0–0.5)
LDLC SERPL CALC-MCNC: 132 MG/DL (ref 0–100)
LDLC/HDLC SERPL: 3.02 {RATIO}
LYMPHOCYTES # BLD AUTO: 1.58 10*3/MM3 (ref 0.7–3.1)
LYMPHOCYTES NFR BLD AUTO: 31.3 % (ref 19.6–45.3)
MCH RBC QN AUTO: 29.9 PG (ref 26.6–33)
MCHC RBC AUTO-ENTMCNC: 33.5 G/DL (ref 31.5–35.7)
MCV RBC AUTO: 89.2 FL (ref 79–97)
MONOCYTES # BLD AUTO: 0.55 10*3/MM3 (ref 0.1–0.9)
MONOCYTES NFR BLD AUTO: 10.9 % (ref 5–12)
NEUTROPHILS NFR BLD AUTO: 2.91 10*3/MM3 (ref 1.7–7)
NEUTROPHILS NFR BLD AUTO: 57.6 % (ref 42.7–76)
NRBC BLD AUTO-RTO: 0 /100 WBC (ref 0–0.2)
PLATELET # BLD AUTO: 309 10*3/MM3 (ref 140–450)
PMV BLD AUTO: 10.7 FL (ref 6–12)
POTASSIUM SERPL-SCNC: 4.5 MMOL/L (ref 3.5–5.2)
PROT SERPL-MCNC: 7.1 G/DL (ref 6–8.5)
RBC # BLD AUTO: 4.72 10*6/MM3 (ref 3.77–5.28)
SODIUM SERPL-SCNC: 138 MMOL/L (ref 136–145)
TRIGL SERPL-MCNC: 121 MG/DL (ref 0–150)
VLDLC SERPL-MCNC: 22 MG/DL (ref 5–40)
WBC NRBC COR # BLD AUTO: 5.05 10*3/MM3 (ref 3.4–10.8)